# Patient Record
Sex: MALE | Race: WHITE | NOT HISPANIC OR LATINO | Employment: OTHER | URBAN - METROPOLITAN AREA
[De-identification: names, ages, dates, MRNs, and addresses within clinical notes are randomized per-mention and may not be internally consistent; named-entity substitution may affect disease eponyms.]

---

## 2017-06-28 ENCOUNTER — ANESTHESIA EVENT (OUTPATIENT)
Dept: SURGERY | Facility: HOSPITAL | Age: 82
DRG: 330 | End: 2017-06-28
Payer: MEDICARE

## 2017-06-28 ENCOUNTER — HOSPITAL ENCOUNTER (OUTPATIENT)
Dept: CARDIOLOGY | Facility: CLINIC | Age: 82
Discharge: HOME | End: 2017-06-28
Payer: MEDICARE

## 2017-06-28 ENCOUNTER — OFFICE VISIT (OUTPATIENT)
Dept: SURGERY | Facility: CLINIC | Age: 82
End: 2017-06-28
Payer: MEDICARE

## 2017-06-28 ENCOUNTER — HOSPITAL ENCOUNTER (OUTPATIENT)
Dept: PREADMISSION TESTING | Facility: HOSPITAL | Age: 82
Discharge: HOME | End: 2017-06-28
Attending: ANESTHESIOLOGY
Payer: MEDICARE

## 2017-06-28 VITALS — WEIGHT: 156.75 LBS | DIASTOLIC BLOOD PRESSURE: 64 MMHG | SYSTOLIC BLOOD PRESSURE: 143 MMHG | HEART RATE: 72 BPM

## 2017-06-28 VITALS
OXYGEN SATURATION: 98 % | TEMPERATURE: 98 F | DIASTOLIC BLOOD PRESSURE: 67 MMHG | BODY MASS INDEX: 23.11 KG/M2 | SYSTOLIC BLOOD PRESSURE: 103 MMHG | HEART RATE: 67 BPM | WEIGHT: 156 LBS | RESPIRATION RATE: 16 BRPM | HEIGHT: 69 IN

## 2017-06-28 DIAGNOSIS — I48.20 CHRONIC ATRIAL FIBRILLATION: Primary | ICD-10-CM

## 2017-06-28 DIAGNOSIS — C18.2 MALIGNANT NEOPLASM OF ASCENDING COLON: ICD-10-CM

## 2017-06-28 DIAGNOSIS — I48.20 CHRONIC ATRIAL FIBRILLATION: ICD-10-CM

## 2017-06-28 DIAGNOSIS — C18.2 CANCER OF RIGHT COLON: ICD-10-CM

## 2017-06-28 DIAGNOSIS — C18.2 MALIGNANT NEOPLASM OF ASCENDING COLON: Primary | ICD-10-CM

## 2017-06-28 PROCEDURE — 93005 ELECTROCARDIOGRAM TRACING: CPT | Mod: PBBFAC | Performed by: INTERNAL MEDICINE

## 2017-06-28 PROCEDURE — 99999 PR PBB SHADOW E&M-NEW PATIENT-LVL III: CPT | Mod: PBBFAC,,, | Performed by: COLON & RECTAL SURGERY

## 2017-06-28 PROCEDURE — 1126F AMNT PAIN NOTED NONE PRSNT: CPT | Mod: ,,, | Performed by: COLON & RECTAL SURGERY

## 2017-06-28 PROCEDURE — 93010 ELECTROCARDIOGRAM REPORT: CPT | Mod: S$PBB,,, | Performed by: INTERNAL MEDICINE

## 2017-06-28 PROCEDURE — 1159F MED LIST DOCD IN RCRD: CPT | Mod: ,,, | Performed by: COLON & RECTAL SURGERY

## 2017-06-28 PROCEDURE — 99205 OFFICE O/P NEW HI 60 MIN: CPT | Mod: S$PBB,,, | Performed by: COLON & RECTAL SURGERY

## 2017-06-28 RX ORDER — ACETAMINOPHEN 10 MG/ML
1000 INJECTION, SOLUTION INTRAVENOUS
Status: CANCELLED | OUTPATIENT
Start: 2017-06-28 | End: 2017-06-28

## 2017-06-28 RX ORDER — METRONIDAZOLE 250 MG/1
TABLET ORAL
Qty: 3 TABLET | Refills: 0 | Status: ON HOLD | OUTPATIENT
Start: 2017-06-28 | End: 2017-07-06 | Stop reason: HOSPADM

## 2017-06-28 RX ORDER — METOPROLOL SUCCINATE 25 MG/1
TABLET, EXTENDED RELEASE ORAL
Refills: 0 | COMMUNITY
Start: 2017-04-11

## 2017-06-28 RX ORDER — SODIUM CHLORIDE 9 MG/ML
INJECTION, SOLUTION INTRAVENOUS CONTINUOUS
Status: CANCELLED | OUTPATIENT
Start: 2017-06-28

## 2017-06-28 RX ORDER — NEOMYCIN SULFATE 500 MG/1
1000 TABLET ORAL ONCE
Qty: 6 TABLET | Refills: 0 | Status: SHIPPED | OUTPATIENT
Start: 2017-06-28 | End: 2017-06-29

## 2017-06-28 RX ORDER — METRONIDAZOLE 500 MG/100ML
500 INJECTION, SOLUTION INTRAVENOUS
Status: CANCELLED | OUTPATIENT
Start: 2017-06-28

## 2017-06-28 NOTE — PROGRESS NOTES
Patient ID:  Sushant Cruz is a 88 y.o. male     Chief Complaint: No chief complaint on file.       HPI: Cancer at hepatic flexure. C-scope 6/23/17 for iron def anemia. Found to have a mass ascending colon biopsy inv adenoca in villous adenoma.  2 small poiyps removed    CT scan 6/14/17 no evidence of mets. Sigmoid colon in left inguinal hernia. Pulmonary nodule recommend f/u in 3 months.    Had r inguinal hernia in 2016 with small bowel resection for incarceration.    Cardiac clearance. On Elaquis for A fib    ROS:        Constitutional: No fever, chills, activity or appetite change.      HENT: No hearing loss, facial swelling, neck pain or stiffness.       Eyes: No discharge, itching and visual disturbance.      Respiratory: No apnea, cough, choking or shortness of breath.       Cardiovascular: No leg swelling or chest pain      Gastrointestinal: No abdominal distention or change in bowel habbits     Genitourinary: No dysuria, frequency or flank pain.      Musculoskeletal: No arthralgias or gait problem.      Neurological: No dizziness, seizures or weakness.      Hematological: No adenopathy.      Psychiatric/Behavioral: No hallucinations or behavioral problems.       PE:    APPEARANCE: Well nourished, well developed, in no acute distress.   CHEST: Lungs clear. Normal respiratory effort.  CARDIOVASCULAR: Normal rhythm. No edema.  ABDOMEN: Soft. No tenderness or masses.  Rectum:  Normal skin, NST, no masses or tenderness    Musculoskeletal: Symmetric, normal range of motion and strength.   Neurological: Alert and oriented to person, place, and time. Normal reflexes.   Skin: Skin is warm and dry.   Psychiatric: Normal mood and affect. Behavior is normal and appropriate.     Impression:  Hepatic flexure adenocarcinoma     PLAN: right colectomy.  I have explained the procedure including indications, alternatives, expected outcomes and potential complications. The patient appears to understand and gives informed  consent. The patient is medically ready for surgery.

## 2017-06-28 NOTE — H&P
Sushant Cruz is a 88 y.o. male     Chief Complaint: No chief complaint on file.       HPI: Cancer at hepatic flexure. C-scope 6/23/17 for iron def anemia. Found to have a mass ascending colon biopsy inv adenoca in villous adenoma.  2 small poiyps removed    CT scan 6/14/17 no evidence of mets. Sigmoid colon in left inguinal hernia. Pulmonary nodule recommend f/u in 3 months.    Had r inguinal hernia in 2016 with small bowel resection for incarceration.    Cardiac clearance. On Elaquis for A fib    ROS:        Constitutional: No fever, chills, activity or appetite change.      HENT: No hearing loss, facial swelling, neck pain or stiffness.       Eyes: No discharge, itching and visual disturbance.      Respiratory: No apnea, cough, choking or shortness of breath.       Cardiovascular: No leg swelling or chest pain      Gastrointestinal: No abdominal distention or change in bowel habbits     Genitourinary: No dysuria, frequency or flank pain.      Musculoskeletal: No arthralgias or gait problem.      Neurological: No dizziness, seizures or weakness.      Hematological: No adenopathy.      Psychiatric/Behavioral: No hallucinations or behavioral problems.       PE:    APPEARANCE: Well nourished, well developed, in no acute distress.   CHEST: Lungs clear. Normal respiratory effort.  CARDIOVASCULAR: Normal rhythm. No edema.  ABDOMEN: Soft. No tenderness or masses.  Rectum:  Normal skin, NST, no masses or tenderness    Musculoskeletal: Symmetric, normal range of motion and strength.   Neurological: Alert and oriented to person, place, and time. Normal reflexes.   Skin: Skin is warm and dry.   Psychiatric: Normal mood and affect. Behavior is normal and appropriate.     Impression:  Hepatic flexure adenocarcinoma     PLAN: right colectomy.  I have explained the procedure including indications, alternatives, expected outcomes and potential complications. The patient appears to understand and gives informed consent. The  patient is medically ready for surgery.

## 2017-06-28 NOTE — ANESTHESIA PREPROCEDURE EVALUATION
Pre-operative evaluation for Procedure(s) (LRB):  COLECTOMY-RIGHT (N/A)    Sushant Cruz is a very healthy 88 y.o. male with pmh of A-fib, anemia and colon cancer who presents for pre-op evaluation for above procedure. Most recent anesthesia, 2016 for R inguinal hernia repair done at an OSH, performed under general anesthesia. Pt. Denies any problems with anesthesia for this procedure or any prior procedures. 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 He takes the stairs everyday, both up and down and only notes mild SOB when he reaches the 5th floor. He has only recently developed this mild SOB, ~6 months ago, and believes it is associated with the onset of his A-fib.     Concerning A-fib, he was diagnosed incidentally during pre-op evaluation for his hernia repair in 2016. Pt. is rate controlled on metoprolol and anticoagulated with pradaxa. He follows with Dr. Tao Johnson with [employer] [facility] cardiology for his A-fib, and was instructed by his cardiologist to stop the pradaxa 5 days before this surgery.     Pt. Also diagnosed with iron deficiency anemia. Bleeding believed to be concomitant of colon cancer and pradaxa use.     Prev airway:   None on file    Patient Active Problem List   Diagnosis    Malignant neoplasm of ascending colon    Chronic atrial fibrillation       Review of patient's allergies indicates:  No Known Allergies     Current Outpatient Prescriptions on File Prior to Encounter   Medication Sig Dispense Refill    metoprolol succinate (TOPROL-XL) 25 MG 24 hr tablet TK 1 T PO QD  0    metronidazole (FLAGYL) 250 MG tablet Take tablet at 1300, 1400, and 2300 day prior to surgery 3 tablet 0    neomycin (MYCIFRADIN) 500 mg Tab Take 2 tablets (1,000 mg total) by mouth once. Day before surgery take 1,000 mg (2 tablets) at 1300, 1400, and 2300 hours 6 tablet 0     No current facility-administered medications on file  prior to encounter.        No past surgical history on file.    Social History     Social History    Marital status:      Spouse name: N/A    Number of children: N/A    Years of education: N/A     Occupational History    Not on file.     Social History Main Topics    Smoking status: Not on file    Smokeless tobacco: Never Used    Alcohol use Not on file    Drug use: Unknown    Sexual activity: Not on file     Other Topics Concern    Not on file     Social History Narrative    No narrative on file         Vital Signs Range (Last 24H):  Pulse:  [72]   BP: (143)/(64)       CBC: No results for input(s): WBC, RBC, HGB, HCT, PLT, MCV, MCH, MCHC in the last 72 hours.    CMP: No results for input(s): NA, K, CL, CO2, BUN, CREATININE, GLU, MG, PHOS, CALCIUM, ALBUMIN, PROT, ALKPHOS, ALT, AST, BILITOT in the last 72 hours.    INR  No results for input(s): INR, PROTIME, APTT in the last 72 hours.    Invalid input(s): PT    EKG:  Pending       Anesthesia Evaluation    I have reviewed the Patient Summary Reports.    I have reviewed the Nursing Notes.   I have reviewed the Medications.     Review of Systems  Anesthesia Hx:  No problems with previous Anesthesia  History of prior surgery of interest to airway management or planning: (Inguinal hernia 2016) Denies Family Hx of Anesthesia complications.   Denies Personal Hx of Anesthesia complications.   Social:  Non-Smoker    Hematology/Oncology:         -- Anemia: Current/Recent Cancer.   EENT/Dental:EENT/Dental Normal   Cardiovascular:   Exercise tolerance: good Denies Hypertension.  Denies CAD.   Dysrhythmias atrial fibrillation  Denies Angina.        Pulmonary:  Pulmonary Normal    Renal/:  Renal/ Normal     Hepatic/GI:  Hepatic/GI Normal    Musculoskeletal:  Musculoskeletal Normal    Neurological:  Neurology Normal        6/30/17: Outside [facility] Records scanned 6/29/17.  Most recent Echo on pg. 91 and most recent Cardiology note on pg. 103--Moody  RN        Physical Exam  General:  Well nourished Appears younger than stated age.     Airway/Jaw/Neck:  Airway Findings: Mouth Opening: Normal Tongue: Normal  General Airway Assessment: Adult  Mallampati: I  Improves to I with phonation.  TM Distance: Normal, at least 6 cm      Dental:  Dental Findings: In tact   Chest/Lungs:  Chest/Lungs Findings: Clear to auscultation, Normal Respiratory Rate     Heart/Vascular:  Heart Findings: Rate: Normal  Rhythm: Regular Rhythm  Sounds: Normal  Heart murmur: negative    Abdomen:  Abdomen Findings: Normal      Mental Status:  Mental Status Findings:  Cooperative, Alert and Oriented         Anesthesia Plan  Type of Anesthesia, risks & benefits discussed:  Anesthesia Type:  general  Patient's Preference: General   Intra-op Monitoring Plan: standard ASA monitors  Intra-op Monitoring Plan Comments:   Post Op Pain Control Plan: multimodal analgesia and IV/PO Opioids PRN  Post Op Pain Control Plan Comments:   Induction:   IV  Beta Blocker:  Patient is on a Beta-Blocker and has received one dose within the past 24 hours (No further documentation required).       Informed Consent: Patient understands risks and agrees with Anesthesia plan.  Questions answered. Anesthesia consent signed with patient.  ASA Score: 3     Day of Surgery Review of History & Physical:    H&P update referred to the surgeon.     Anesthesia Plan Notes: NPO confirmed.   No history of anesthesia problems.   2nd PIV          Ready For Surgery From Anesthesia Perspective.

## 2017-06-29 RX ORDER — WITCH HAZEL 50 %
2000 PADS, MEDICATED (EA) TOPICAL DAILY
COMMUNITY

## 2017-06-29 RX ORDER — FERROUS SULFATE 325(65) MG
325 TABLET ORAL 3 TIMES DAILY
COMMUNITY

## 2017-07-03 ENCOUNTER — HOSPITAL ENCOUNTER (INPATIENT)
Facility: HOSPITAL | Age: 82
LOS: 3 days | Discharge: HOME | DRG: 330 | End: 2017-07-06
Attending: COLON & RECTAL SURGERY | Admitting: COLON & RECTAL SURGERY
Payer: MEDICARE

## 2017-07-03 ENCOUNTER — ANESTHESIA (OUTPATIENT)
Dept: SURGERY | Facility: HOSPITAL | Age: 82
DRG: 330 | End: 2017-07-03
Payer: MEDICARE

## 2017-07-03 ENCOUNTER — SURGERY (OUTPATIENT)
Age: 82
End: 2017-07-03

## 2017-07-03 DIAGNOSIS — C18.2 MALIGNANT NEOPLASM OF ASCENDING COLON: ICD-10-CM

## 2017-07-03 DIAGNOSIS — C18.2 CANCER OF RIGHT COLON: ICD-10-CM

## 2017-07-03 DIAGNOSIS — I48.20 CHRONIC ATRIAL FIBRILLATION: Primary | ICD-10-CM

## 2017-07-03 LAB
ABO + RH BLD: NORMAL
BLD GP AB SCN CELLS X3 SERPL QL: NORMAL

## 2017-07-03 PROCEDURE — 25000003 PHARM REV CODE 250: Performed by: NURSE ANESTHETIST, CERTIFIED REGISTERED

## 2017-07-03 PROCEDURE — 63600175 PHARM REV CODE 636 W HCPCS: Performed by: COLON & RECTAL SURGERY

## 2017-07-03 PROCEDURE — 25000003 PHARM REV CODE 250: Performed by: SURGERY

## 2017-07-03 PROCEDURE — C1769 GUIDE WIRE: HCPCS | Performed by: COLON & RECTAL SURGERY

## 2017-07-03 PROCEDURE — 71000039 HC RECOVERY, EACH ADD'L HOUR: Performed by: COLON & RECTAL SURGERY

## 2017-07-03 PROCEDURE — 88307 TISSUE EXAM BY PATHOLOGIST: CPT | Performed by: PATHOLOGY

## 2017-07-03 PROCEDURE — 36000709 HC OR TIME LEV III EA ADD 15 MIN: Performed by: COLON & RECTAL SURGERY

## 2017-07-03 PROCEDURE — 86900 BLOOD TYPING SEROLOGIC ABO: CPT

## 2017-07-03 PROCEDURE — 0T9B80Z DRAINAGE OF BLADDER WITH DRAINAGE DEVICE, VIA NATURAL OR ARTIFICIAL OPENING ENDOSCOPIC: ICD-10-PCS | Performed by: UROLOGY

## 2017-07-03 PROCEDURE — 0WQF0ZZ REPAIR ABDOMINAL WALL, OPEN APPROACH: ICD-10-PCS | Performed by: COLON & RECTAL SURGERY

## 2017-07-03 PROCEDURE — 25000003 PHARM REV CODE 250: Performed by: COLON & RECTAL SURGERY

## 2017-07-03 PROCEDURE — 63600175 PHARM REV CODE 636 W HCPCS: Performed by: SURGERY

## 2017-07-03 PROCEDURE — 37000009 HC ANESTHESIA EA ADD 15 MINS: Performed by: COLON & RECTAL SURGERY

## 2017-07-03 PROCEDURE — 86850 RBC ANTIBODY SCREEN: CPT

## 2017-07-03 PROCEDURE — 37000008 HC ANESTHESIA 1ST 15 MINUTES: Performed by: COLON & RECTAL SURGERY

## 2017-07-03 PROCEDURE — D9220A PRA ANESTHESIA: Mod: CRNA,,, | Performed by: NURSE ANESTHETIST, CERTIFIED REGISTERED

## 2017-07-03 PROCEDURE — 71000033 HC RECOVERY, INTIAL HOUR: Performed by: COLON & RECTAL SURGERY

## 2017-07-03 PROCEDURE — 27201423 OPTIME MED/SURG SUP & DEVICES STERILE SUPPLY: Performed by: COLON & RECTAL SURGERY

## 2017-07-03 PROCEDURE — 63600175 PHARM REV CODE 636 W HCPCS: Performed by: NURSE ANESTHETIST, CERTIFIED REGISTERED

## 2017-07-03 PROCEDURE — 49507 PRP I/HERN INIT BLOCK >5 YR: CPT | Mod: 51,,, | Performed by: COLON & RECTAL SURGERY

## 2017-07-03 PROCEDURE — 88307 TISSUE EXAM BY PATHOLOGIST: CPT | Mod: 26,,, | Performed by: PATHOLOGY

## 2017-07-03 PROCEDURE — 20600001 HC STEP DOWN PRIVATE ROOM

## 2017-07-03 PROCEDURE — D9220A PRA ANESTHESIA: Mod: ANES,,, | Performed by: ANESTHESIOLOGY

## 2017-07-03 PROCEDURE — 0DTF0ZZ RESECTION OF RIGHT LARGE INTESTINE, OPEN APPROACH: ICD-10-PCS | Performed by: COLON & RECTAL SURGERY

## 2017-07-03 PROCEDURE — 36000708 HC OR TIME LEV III 1ST 15 MIN: Performed by: COLON & RECTAL SURGERY

## 2017-07-03 PROCEDURE — 44140 PARTIAL REMOVAL OF COLON: CPT | Mod: ,,, | Performed by: COLON & RECTAL SURGERY

## 2017-07-03 RX ORDER — KETAMINE HCL IN 0.9 % NACL 50 MG/5 ML
SYRINGE (ML) INTRAVENOUS
Status: DISCONTINUED | OUTPATIENT
Start: 2017-07-03 | End: 2017-07-03

## 2017-07-03 RX ORDER — ROCURONIUM BROMIDE 10 MG/ML
INJECTION, SOLUTION INTRAVENOUS
Status: DISCONTINUED | OUTPATIENT
Start: 2017-07-03 | End: 2017-07-03

## 2017-07-03 RX ORDER — FENTANYL CITRATE 50 UG/ML
INJECTION, SOLUTION INTRAMUSCULAR; INTRAVENOUS
Status: DISCONTINUED | OUTPATIENT
Start: 2017-07-03 | End: 2017-07-03

## 2017-07-03 RX ORDER — HYDROMORPHONE HYDROCHLORIDE 1 MG/ML
1 INJECTION, SOLUTION INTRAMUSCULAR; INTRAVENOUS; SUBCUTANEOUS EVERY 4 HOURS PRN
Status: DISCONTINUED | OUTPATIENT
Start: 2017-07-03 | End: 2017-07-06 | Stop reason: HOSPADM

## 2017-07-03 RX ORDER — NEOSTIGMINE METHYLSULFATE 1 MG/ML
INJECTION, SOLUTION INTRAVENOUS
Status: DISCONTINUED | OUTPATIENT
Start: 2017-07-03 | End: 2017-07-03

## 2017-07-03 RX ORDER — SODIUM CHLORIDE 0.9 % (FLUSH) 0.9 %
3 SYRINGE (ML) INJECTION EVERY 8 HOURS
Status: DISCONTINUED | OUTPATIENT
Start: 2017-07-03 | End: 2017-07-03 | Stop reason: HOSPADM

## 2017-07-03 RX ORDER — BUPIVACAINE HYDROCHLORIDE 2.5 MG/ML
INJECTION, SOLUTION EPIDURAL; INFILTRATION; INTRACAUDAL
Status: DISCONTINUED | OUTPATIENT
Start: 2017-07-03 | End: 2017-07-03 | Stop reason: HOSPADM

## 2017-07-03 RX ORDER — ONDANSETRON 2 MG/ML
4 INJECTION INTRAMUSCULAR; INTRAVENOUS DAILY PRN
Status: DISCONTINUED | OUTPATIENT
Start: 2017-07-03 | End: 2017-07-03 | Stop reason: HOSPADM

## 2017-07-03 RX ORDER — NALOXONE HCL 0.4 MG/ML
VIAL (ML) INJECTION
Status: DISCONTINUED | OUTPATIENT
Start: 2017-07-03 | End: 2017-07-03

## 2017-07-03 RX ORDER — ONDANSETRON 2 MG/ML
4 INJECTION INTRAMUSCULAR; INTRAVENOUS EVERY 12 HOURS PRN
Status: DISCONTINUED | OUTPATIENT
Start: 2017-07-03 | End: 2017-07-06 | Stop reason: HOSPADM

## 2017-07-03 RX ORDER — ACETAMINOPHEN 10 MG/ML
1000 INJECTION, SOLUTION INTRAVENOUS EVERY 8 HOURS
Status: DISPENSED | OUTPATIENT
Start: 2017-07-03 | End: 2017-07-04

## 2017-07-03 RX ORDER — DEXAMETHASONE SODIUM PHOSPHATE 4 MG/ML
INJECTION, SOLUTION INTRA-ARTICULAR; INTRALESIONAL; INTRAMUSCULAR; INTRAVENOUS; SOFT TISSUE
Status: DISCONTINUED | OUTPATIENT
Start: 2017-07-03 | End: 2017-07-03

## 2017-07-03 RX ORDER — METRONIDAZOLE 500 MG/100ML
500 INJECTION, SOLUTION INTRAVENOUS
Status: COMPLETED | OUTPATIENT
Start: 2017-07-03 | End: 2017-07-03

## 2017-07-03 RX ORDER — ACETAMINOPHEN 10 MG/ML
1000 INJECTION, SOLUTION INTRAVENOUS
Status: COMPLETED | OUTPATIENT
Start: 2017-07-03 | End: 2017-07-03

## 2017-07-03 RX ORDER — SODIUM CHLORIDE 9 MG/ML
INJECTION, SOLUTION INTRAVENOUS CONTINUOUS
Status: DISCONTINUED | OUTPATIENT
Start: 2017-07-03 | End: 2017-07-06 | Stop reason: HOSPADM

## 2017-07-03 RX ORDER — SODIUM CHLORIDE 0.9 % (FLUSH) 0.9 %
3 SYRINGE (ML) INJECTION
Status: DISCONTINUED | OUTPATIENT
Start: 2017-07-03 | End: 2017-07-03 | Stop reason: HOSPADM

## 2017-07-03 RX ORDER — METOPROLOL SUCCINATE 25 MG/1
25 TABLET, EXTENDED RELEASE ORAL DAILY
Status: DISCONTINUED | OUTPATIENT
Start: 2017-07-03 | End: 2017-07-06 | Stop reason: HOSPADM

## 2017-07-03 RX ORDER — NALOXONE HCL 0.4 MG/ML
0.02 VIAL (ML) INJECTION
Status: DISCONTINUED | OUTPATIENT
Start: 2017-07-03 | End: 2017-07-06 | Stop reason: HOSPADM

## 2017-07-03 RX ORDER — ALVIMOPAN 12 MG/1
12 CAPSULE ORAL 2 TIMES DAILY
Status: DISCONTINUED | OUTPATIENT
Start: 2017-07-03 | End: 2017-07-06 | Stop reason: HOSPADM

## 2017-07-03 RX ORDER — MIDAZOLAM HYDROCHLORIDE 1 MG/ML
INJECTION, SOLUTION INTRAMUSCULAR; INTRAVENOUS
Status: DISCONTINUED | OUTPATIENT
Start: 2017-07-03 | End: 2017-07-03

## 2017-07-03 RX ORDER — FENTANYL CITRATE 50 UG/ML
25 INJECTION, SOLUTION INTRAMUSCULAR; INTRAVENOUS EVERY 5 MIN PRN
Status: DISCONTINUED | OUTPATIENT
Start: 2017-07-03 | End: 2017-07-03 | Stop reason: HOSPADM

## 2017-07-03 RX ORDER — HYDROCODONE BITARTRATE AND ACETAMINOPHEN 5; 325 MG/1; MG/1
1 TABLET ORAL EVERY 4 HOURS PRN
Status: DISCONTINUED | OUTPATIENT
Start: 2017-07-03 | End: 2017-07-06 | Stop reason: HOSPADM

## 2017-07-03 RX ORDER — PHENYLEPHRINE HYDROCHLORIDE 10 MG/ML
INJECTION INTRAVENOUS
Status: DISCONTINUED | OUTPATIENT
Start: 2017-07-03 | End: 2017-07-03

## 2017-07-03 RX ORDER — GLYCOPYRROLATE 0.2 MG/ML
INJECTION INTRAMUSCULAR; INTRAVENOUS
Status: DISCONTINUED | OUTPATIENT
Start: 2017-07-03 | End: 2017-07-03

## 2017-07-03 RX ORDER — SODIUM CHLORIDE, SODIUM LACTATE, POTASSIUM CHLORIDE, CALCIUM CHLORIDE 600; 310; 30; 20 MG/100ML; MG/100ML; MG/100ML; MG/100ML
INJECTION, SOLUTION INTRAVENOUS CONTINUOUS
Status: DISCONTINUED | OUTPATIENT
Start: 2017-07-03 | End: 2017-07-04

## 2017-07-03 RX ORDER — HYDROMORPHONE HYDROCHLORIDE 2 MG/ML
INJECTION, SOLUTION INTRAMUSCULAR; INTRAVENOUS; SUBCUTANEOUS
Status: DISCONTINUED | OUTPATIENT
Start: 2017-07-03 | End: 2017-07-03

## 2017-07-03 RX ORDER — LIDOCAINE HCL/PF 100 MG/5ML
SYRINGE (ML) INTRAVENOUS
Status: DISCONTINUED | OUTPATIENT
Start: 2017-07-03 | End: 2017-07-03

## 2017-07-03 RX ORDER — PROPOFOL 10 MG/ML
VIAL (ML) INTRAVENOUS
Status: DISCONTINUED | OUTPATIENT
Start: 2017-07-03 | End: 2017-07-03

## 2017-07-03 RX ADMIN — NALOXONE HYDROCHLORIDE 40 MCG: 0.4 INJECTION, SOLUTION INTRAMUSCULAR; INTRAVENOUS; SUBCUTANEOUS at 11:07

## 2017-07-03 RX ADMIN — NEOSTIGMINE METHYLSULFATE 4 MG: 1 INJECTION INTRAVENOUS at 10:07

## 2017-07-03 RX ADMIN — PROPOFOL 150 MG: 10 INJECTION, EMULSION INTRAVENOUS at 08:07

## 2017-07-03 RX ADMIN — LIDOCAINE HYDROCHLORIDE 80 MG: 20 INJECTION, SOLUTION INTRAVENOUS at 08:07

## 2017-07-03 RX ADMIN — ROCURONIUM BROMIDE 50 MG: 10 INJECTION, SOLUTION INTRAVENOUS at 08:07

## 2017-07-03 RX ADMIN — METOPROLOL SUCCINATE 25 MG: 25 TABLET, EXTENDED RELEASE ORAL at 11:07

## 2017-07-03 RX ADMIN — BUPIVACAINE 20 ML: 13.3 INJECTION, SUSPENSION, LIPOSOMAL INFILTRATION at 09:07

## 2017-07-03 RX ADMIN — ALVIMOPAN 12 MG: 12 CAPSULE ORAL at 11:07

## 2017-07-03 RX ADMIN — FENTANYL CITRATE 100 MCG: 50 INJECTION, SOLUTION INTRAMUSCULAR; INTRAVENOUS at 08:07

## 2017-07-03 RX ADMIN — ROCURONIUM BROMIDE 10 MG: 10 INJECTION, SOLUTION INTRAVENOUS at 09:07

## 2017-07-03 RX ADMIN — IBUPROFEN 800 MG: 800 INJECTION INTRAVENOUS at 03:07

## 2017-07-03 RX ADMIN — Medication 25 MG: at 09:07

## 2017-07-03 RX ADMIN — DEXAMETHASONE SODIUM PHOSPHATE 4 MG: 4 INJECTION, SOLUTION INTRAMUSCULAR; INTRAVENOUS at 08:07

## 2017-07-03 RX ADMIN — PHENYLEPHRINE HYDROCHLORIDE 100 MCG: 10 INJECTION INTRAVENOUS at 09:07

## 2017-07-03 RX ADMIN — HYDROMORPHONE HYDROCHLORIDE 0.5 MG: 2 INJECTION, SOLUTION INTRAMUSCULAR; INTRAVENOUS; SUBCUTANEOUS at 09:07

## 2017-07-03 RX ADMIN — PROPOFOL 40 MG: 10 INJECTION, EMULSION INTRAVENOUS at 10:07

## 2017-07-03 RX ADMIN — SODIUM CHLORIDE, SODIUM GLUCONATE, SODIUM ACETATE, POTASSIUM CHLORIDE, MAGNESIUM CHLORIDE, SODIUM PHOSPHATE, DIBASIC, AND POTASSIUM PHOSPHATE: .53; .5; .37; .037; .03; .012; .00082 INJECTION, SOLUTION INTRAVENOUS at 09:07

## 2017-07-03 RX ADMIN — FENTANYL CITRATE 50 MCG: 50 INJECTION, SOLUTION INTRAMUSCULAR; INTRAVENOUS at 09:07

## 2017-07-03 RX ADMIN — SODIUM CHLORIDE, SODIUM GLUCONATE, SODIUM ACETATE, POTASSIUM CHLORIDE, MAGNESIUM CHLORIDE, SODIUM PHOSPHATE, DIBASIC, AND POTASSIUM PHOSPHATE: .53; .5; .37; .037; .03; .012; .00082 INJECTION, SOLUTION INTRAVENOUS at 08:07

## 2017-07-03 RX ADMIN — SODIUM CHLORIDE: 0.9 INJECTION, SOLUTION INTRAVENOUS at 07:07

## 2017-07-03 RX ADMIN — MIDAZOLAM HYDROCHLORIDE 0.5 MG: 1 INJECTION, SOLUTION INTRAMUSCULAR; INTRAVENOUS at 08:07

## 2017-07-03 RX ADMIN — ACETAMINOPHEN 1000 MG: 10 INJECTION, SOLUTION INTRAVENOUS at 10:07

## 2017-07-03 RX ADMIN — IBUPROFEN 800 MG: 800 INJECTION INTRAVENOUS at 08:07

## 2017-07-03 RX ADMIN — BUPIVACAINE HYDROCHLORIDE 30 ML: 2.5 INJECTION, SOLUTION EPIDURAL; INFILTRATION; INTRACAUDAL; PERINEURAL at 09:07

## 2017-07-03 RX ADMIN — ALVIMOPAN 12 MG: 12 CAPSULE ORAL at 09:07

## 2017-07-03 RX ADMIN — SODIUM CHLORIDE, SODIUM LACTATE, POTASSIUM CHLORIDE, AND CALCIUM CHLORIDE: .6; .31; .03; .02 INJECTION, SOLUTION INTRAVENOUS at 11:07

## 2017-07-03 RX ADMIN — ACETAMINOPHEN 1000 MG: 10 INJECTION, SOLUTION INTRAVENOUS at 07:07

## 2017-07-03 RX ADMIN — HYDROCODONE BITARTRATE AND ACETAMINOPHEN 1 TABLET: 5; 325 TABLET ORAL at 11:07

## 2017-07-03 RX ADMIN — GLYCOPYRROLATE 0.6 MG: 0.2 INJECTION, SOLUTION INTRAMUSCULAR; INTRAVENOUS at 10:07

## 2017-07-03 RX ADMIN — ACETAMINOPHEN 1000 MG: 10 INJECTION, SOLUTION INTRAVENOUS at 02:07

## 2017-07-03 RX ADMIN — IBUPROFEN 800 MG: 800 INJECTION INTRAVENOUS at 09:07

## 2017-07-03 RX ADMIN — CEFTRIAXONE 2 G: 2 INJECTION, SOLUTION INTRAVENOUS at 08:07

## 2017-07-03 RX ADMIN — METRONIDAZOLE 500 MG: 500 SOLUTION INTRAVENOUS at 08:07

## 2017-07-03 NOTE — PROGRESS NOTES
Report called to meaghan Arana made ready fro transport to [facility] via stretcher per PCT, daughter informed of room number, pt remains stable.

## 2017-07-03 NOTE — ANESTHESIA POSTPROCEDURE EVALUATION
"Anesthesia Post Evaluation    Patient: Sushant Cruz    Procedure(s) Performed: Procedure(s) (LRB):  COLECTOMY-RIGHT (N/A)  CYSTOSCOPY  REPAIR-HERNIA-INGUINAL  (Left)    Final Anesthesia Type: general  Patient location during evaluation: PACU  Patient participation: Yes- Able to Participate  Level of consciousness: awake and alert  Post-procedure vital signs: reviewed and stable  Pain management: adequate  Airway patency: patent  PONV status at discharge: No PONV  Anesthetic complications: yes  Perioperative Events: use of sedation/narcotic reversal agents    Cardiovascular status: blood pressure returned to baseline  Respiratory status: unassisted  Hydration status: euvolemic  Follow-up not needed.  Comments: Single dose 40mcg of naloxone intra op.  Patient "wide" awake now, smiling and conversing without any notions of sedation.        Visit Vitals  /61   Pulse (!) 58   Temp 36.5 °C (97.7 °F) (Oral)   Resp (!) 9   Ht 5' 9" (1.753 m)   Wt 70.8 kg (156 lb)   SpO2 96%   BMI 23.04 kg/m²       Pain/Enrique Score: Pain Assessment Performed: Yes (7/3/2017 11:14 AM)  Presence of Pain: denies (7/3/2017 11:14 AM)  Pain Rating Prior to Med Admin: 4 (7/3/2017 11:35 AM)      "

## 2017-07-03 NOTE — PLAN OF CARE
Problem: Patient Care Overview  Goal: Plan of Care Review  Outcome: Ongoing (interventions implemented as appropriate)  Care plan reviewed and understood by patient. RR even and unlabored. VSS. Tolerating clear liquid diet with no complaints of N/V. Pt remains free of falls. No acute pain or distress noted. Will continue to monitor.

## 2017-07-03 NOTE — INTERVAL H&P NOTE
The patient has been examined and the H&P has been reviewed:    I concur with the findings and no changes have occurred since H&P was written.    Anesthesia/Surgery risks, benefits and alternative options discussed and understood by patient/family.          Active Hospital Problems    Diagnosis  POA    Cancer of right colon [C18.2]  Yes      Resolved Hospital Problems    Diagnosis Date Resolved POA   No resolved problems to display.

## 2017-07-03 NOTE — ANESTHESIA RELEASE NOTE
"Anesthesia Release from PACU Note    Patient: Sushant Cruz    Procedure(s) Performed: Procedure(s) (LRB):  COLECTOMY-RIGHT (N/A)  CYSTOSCOPY  REPAIR-HERNIA-INGUINAL  (Left)    Anesthesia type: general    Post pain: Adequate analgesia    Post assessment: no apparent anesthetic complications    Last Vitals:   Visit Vitals  /61   Pulse (!) 58   Temp 36.5 °C (97.7 °F) (Oral)   Resp (!) 9   Ht 5' 9" (1.753 m)   Wt 70.8 kg (156 lb)   SpO2 96%   BMI 23.04 kg/m²       Post vital signs: stable    Level of consciousness: awake    Nausea/Vomiting: no nausea/no vomiting    Complications: none    Airway Patency: patent    Respiratory: unassisted    Cardiovascular: stable and blood pressure at baseline    Hydration: euvolemic  "

## 2017-07-03 NOTE — OP NOTE
[facility]  Operative Note    Date: 07/03/2017    Pre-Op Diagnosis:    1. Difficult jon placement    Patient Active Problem List   Diagnosis    Malignant neoplasm of ascending colon    Chronic atrial fibrillation    Cancer of right colon       Post-Op Diagnosis: same    Procedure(s) Performed:   1.  Cystourethroscopy   2.  Jon catheter placement    Specimen(s): none    Staff Surgeon: Griffin Malone MD    Assistant Surgeon: Kim Belle MD    Anesthesia:  General endotracheal anesthesia    Indications: Sushant Cruz is a 88 y.o. male scheduled to undergo colectomy with Dr. Schwartz for rectal cancer. Called to OR for difficult jon placement, patient has no urologic history in the chart.    Findings:    1.  Poor visualization due to bleeding  2.  No evidence of stricture  3.  Able to advance scope into bladder without issues however no definitive etiology identified    Estimated Blood Loss: min    Drains:   1.  18 Fr [place] tip jon    Procedure in detail:    Upon arrival to the room the patient was asleep and in the supine position.    The penis was prepped and draped in the usual sterile fashion.  A flexible cystoscope was introduced into the penis.  Visualization was very difficult due to bleeding from previous placement attempt. The urethra was navigated as best as possible and the scope was kept anteriorly flexed. We were able to pass this into the bladder. A 18 Fr [place] jon was inserted over the wire into the bladder.  Clear yellow urine was returned.  The jon was attached to a  bag and secured to the patient's leg.      The case was handed over to the primary surgical team    Disposition:  Remove jon per protocol.     Kim Belle MD

## 2017-07-03 NOTE — TRANSFER OF CARE
"Anesthesia Transfer of Care Note    Patient: Sushant Cruz    Procedure(s) Performed: Procedure(s) (LRB):  COLECTOMY-RIGHT (N/A)  CYSTOSCOPY  REPAIR-HERNIA-INGUINAL  (Left)    Patient location: PACU    Anesthesia Type: general    Transport from OR: Transported from OR on 6-10 L/min O2 by face mask with adequate spontaneous ventilation    Post pain: adequate analgesia    Post assessment: no apparent anesthetic complications and tolerated procedure well    Post vital signs: stable    Level of consciousness: awake and responds to stimulation    Nausea/Vomiting: no nausea/vomiting    Complications: none    Transfer of care protocol was followed      Last vitals:   Visit Vitals  /66 (BP Location: Right arm, Patient Position: Lying, BP Method: Automatic)   Pulse 63   Temp (!) 30.9 °C (87.7 °F) (Oral)   Resp 18   Ht 5' 9" (1.753 m)   Wt 70.8 kg (156 lb)   SpO2 95%   BMI 23.04 kg/m²     "

## 2017-07-03 NOTE — PROGRESS NOTES
Pt resting quietly,VSS per monitor, resp unlabored, abd incision remains dry and intact, dewey po fluids and meds, c/o abd pain 2/10, states tolerable, stable at present.

## 2017-07-03 NOTE — BRIEF OP NOTE
[facility]  Colon and Rectal Surgery  Operative Note    SUMMARY     Date of Procedure: 7/3/2017     Procedure: Procedure(s) (LRB):  COLECTOMY-RIGHT (N/A)  CYSTOSCOPY Left inguinal hernia repair    Surgeon(s) and Role:     * Ernst Mcpherson MD - Fellow     * Robin Schwartz MD - Primary     * Kim Belle MD - Resident - Assisting     * Jimmy Johnson MD - Resident - Assisting        Pre-Operative Diagnosis: Malignant neoplasm of ascending colon [C18.2]    Post-Operative Diagnosis: Post-Op Diagnosis Codes:     * Malignant neoplasm of ascending colon [C18.2]    Anesthesia: General, Exparel 266 mg, Marcaine 0.25% (75 mg) preperironeal injection      Technical Procedures Used: STSFETE LC 75    Description of the Findings of the Procedure: Monica at hepatic flexure. Left inguinal hernia with sigmoid colon in sac    Significant Surgical Tasks Conducted by the Assistant(s), if Applicable: n/a    Complications: No    Estimated Blood Loss (EBL): 100 mL           Implants: * No implants in log *    Specimens:   Specimen (12h ago through future)    None           Condition: Good    Disposition: PACU - hemodynamically stable.    Attestation: I was present and scrubbed for the entire procedure.

## 2017-07-03 NOTE — PROGRESS NOTES
Orders reviewed and consents verified. Pre-op care complete. Type and screen sent to blood bank. Family at bedside.

## 2017-07-04 LAB
ANION GAP SERPL CALC-SCNC: 6 MMOL/L
BASOPHILS # BLD AUTO: 0.06 K/UL
BASOPHILS NFR BLD: 0.8 %
BUN SERPL-MCNC: 19 MG/DL
CALCIUM SERPL-MCNC: 9.1 MG/DL
CHLORIDE SERPL-SCNC: 107 MMOL/L
CO2 SERPL-SCNC: 21 MMOL/L
CREAT SERPL-MCNC: 1.1 MG/DL
DIFFERENTIAL METHOD: ABNORMAL
EOSINOPHIL # BLD AUTO: 0 K/UL
EOSINOPHIL NFR BLD: 0.3 %
ERYTHROCYTE [DISTWIDTH] IN BLOOD BY AUTOMATED COUNT: 17.5 %
EST. GFR  (AFRICAN AMERICAN): >60 ML/MIN/1.73 M^2
EST. GFR  (NON AFRICAN AMERICAN): 59.6 ML/MIN/1.73 M^2
GLUCOSE SERPL-MCNC: 90 MG/DL
HCT VFR BLD AUTO: 35.8 %
HGB BLD-MCNC: 11.7 G/DL
LYMPHOCYTES # BLD AUTO: 1.5 K/UL
LYMPHOCYTES NFR BLD: 21.3 %
MCH RBC QN AUTO: 31.5 PG
MCHC RBC AUTO-ENTMCNC: 32.7 %
MCV RBC AUTO: 96 FL
MONOCYTES # BLD AUTO: 0.6 K/UL
MONOCYTES NFR BLD: 9 %
NEUTROPHILS # BLD AUTO: 4.9 K/UL
NEUTROPHILS NFR BLD: 68.5 %
PLATELET # BLD AUTO: 244 K/UL
PMV BLD AUTO: 10.5 FL
POTASSIUM SERPL-SCNC: 4.2 MMOL/L
RBC # BLD AUTO: 3.72 M/UL
SODIUM SERPL-SCNC: 134 MMOL/L
WBC # BLD AUTO: 7.12 K/UL

## 2017-07-04 PROCEDURE — 36415 COLL VENOUS BLD VENIPUNCTURE: CPT

## 2017-07-04 PROCEDURE — 20600001 HC STEP DOWN PRIVATE ROOM

## 2017-07-04 PROCEDURE — 85025 COMPLETE CBC W/AUTO DIFF WBC: CPT

## 2017-07-04 PROCEDURE — 25000003 PHARM REV CODE 250: Performed by: SURGERY

## 2017-07-04 PROCEDURE — 51798 US URINE CAPACITY MEASURE: CPT

## 2017-07-04 PROCEDURE — 94760 N-INVAS EAR/PLS OXIMETRY 1: CPT

## 2017-07-04 PROCEDURE — 80048 BASIC METABOLIC PNL TOTAL CA: CPT

## 2017-07-04 PROCEDURE — 63600175 PHARM REV CODE 636 W HCPCS: Performed by: SURGERY

## 2017-07-04 RX ORDER — DEXTROSE MONOHYDRATE, SODIUM CHLORIDE, AND POTASSIUM CHLORIDE 50; 1.49; 4.5 G/1000ML; G/1000ML; G/1000ML
INJECTION, SOLUTION INTRAVENOUS CONTINUOUS
Status: DISCONTINUED | OUTPATIENT
Start: 2017-07-04 | End: 2017-07-06 | Stop reason: HOSPADM

## 2017-07-04 RX ORDER — TAMSULOSIN HYDROCHLORIDE 0.4 MG/1
0.4 CAPSULE ORAL ONCE
Status: COMPLETED | OUTPATIENT
Start: 2017-07-04 | End: 2017-07-04

## 2017-07-04 RX ORDER — METOPROLOL TARTRATE 1 MG/ML
5 INJECTION, SOLUTION INTRAVENOUS EVERY 6 HOURS PRN
Status: DISCONTINUED | OUTPATIENT
Start: 2017-07-04 | End: 2017-07-06 | Stop reason: HOSPADM

## 2017-07-04 RX ADMIN — IBUPROFEN 800 MG: 800 INJECTION INTRAVENOUS at 02:07

## 2017-07-04 RX ADMIN — TAMSULOSIN HYDROCHLORIDE 0.4 MG: 0.4 CAPSULE ORAL at 09:07

## 2017-07-04 RX ADMIN — ALVIMOPAN 12 MG: 12 CAPSULE ORAL at 09:07

## 2017-07-04 RX ADMIN — DEXTROSE MONOHYDRATE, SODIUM CHLORIDE, AND POTASSIUM CHLORIDE: 50; 4.5; 1.49 INJECTION, SOLUTION INTRAVENOUS at 09:07

## 2017-07-04 RX ADMIN — IBUPROFEN 800 MG: 800 INJECTION INTRAVENOUS at 09:07

## 2017-07-04 RX ADMIN — IBUPROFEN 800 MG: 800 INJECTION INTRAVENOUS at 06:07

## 2017-07-04 RX ADMIN — SODIUM CHLORIDE 1000 ML: 0.9 INJECTION, SOLUTION INTRAVENOUS at 09:07

## 2017-07-04 NOTE — PLAN OF CARE
Problem: Patient Care Overview  Goal: Plan of Care Review  Outcome: Ongoing (interventions implemented as appropriate)  Plan of care discussed with pt and pt's daughter. Pt verbalizes understanding. Pt tolerating low fiber diet with no complaints of discomfort or nausea. Pain managed with scheduled ibuprofen. Christensen d/c'd this shift. Pt has voided since removal. Pt has had small amount of liquid output per rectum. Pt on telemetry, afib. Pt ambulated in thomas once this shift. Pt up in chair for most of shift. Pt positions independently. Vital signs stable on RA. Pt remains free of falls and injury. Will continue to monitor.

## 2017-07-04 NOTE — PROGRESS NOTES
Progress Note  CRS    SUBJECTIVE:  Pt seen and examined.  Feeling well, pain well controlled, no issues.  Tolerating clears well, no n/v, feeling hungry.  HD stable.  Low urine output overnight, 200cc.    OBJECTIVE:  Temp:  [97 °F (36.1 °C)-98.4 °F (36.9 °C)]   Pulse:  [48-76]   Resp:  [12-22]   BP: (132-164)/(61-79)   SpO2:  [91 %-97 %]     PHYSICAL EXAM:  NAD  A&O x3  Abdomen soft, non-tender, non-distended. Incision c/d/i.  Jon with clear yellow urine.    I & O (Last 24H):  Intake/Output Summary (Last 24 hours) at 07/04/17 0843  Last data filed at 07/04/17 0511   Gross per 24 hour   Intake             1500 ml   Output              730 ml   Net              770 ml       Lab Results   Component Value Date    WBC 7.12 07/04/2017    HGB 11.7 (L) 07/04/2017    HCT 35.8 (L) 07/04/2017    MCV 96 07/04/2017     07/04/2017     Lab Results   Component Value Date    CREATININE 1.1 07/04/2017    BUN 19 07/04/2017     (L) 07/04/2017    K 4.2 07/04/2017     07/04/2017    CO2 21 (L) 07/04/2017    CALCIUM 9.1 07/04/2017       ASSESSMENT/PLAN:   Sushant Cruz is a 88 y.o. male POD 1 s/p   COLECTOMY-RIGHT (N/A)  CYSTOSCOPY Left inguinal hernia repair    Doing well  CLD - advance today  PO pain control prn  Ambulate  D/c jon - void check. One dose of flomax. Monitor urine output. Cr normal.  Dispo planning.    Matti Camarena MD  Surgery Resident, PGY-V  [phone]

## 2017-07-04 NOTE — OP NOTE
DATE OF PROCEDURE:  07/3/2017     ATTENDING SURGEON ON RECORD:  Robin Schwartz M.D., who was present and scrubbed for the entirety of the case.     FELLOW ASSISTING:  Ernst Mcpherson M.D. (RES)     PREOPERATIVE DIAGNOSIS:  Right Colon adenocarcinoma     POSTOPERATIVE DIAGNOSIS:  Same      PROCEDURE PERFORMED:  Open Right hemicolectomy     ANESTHESIA:  The patient received general anesthesia.     TECHNICAL PROCEDURE PERFORMED:  Open right hemicolectomy, side to side functional end to end stapled anastomosis       OPERATIVE FINDINGS:  Hepatic flexure mass     This was a clean contaminated case.  There were no complications.  one specimen sent to the pathologist being the right colon and terminal ileum.     COMPLICATIONS:  There were no intraoperative complications.     INDICATIONS FOR THE PROCEDURE:  This is an 88-year-old gentleman with finding consistent with colon adenocarcinoma on a diagnostic colonoscopy for anemia.  Metastatic workup was negative. Given these findings he was consented for a Right hemicolectomy.       DESCRIPTION OF PROCEDURE:  The patient was met in the Preoperative Holding area where his consents and site verification forms were reviewed.  Questions were answered.  Risks and benefits of the procedure were explained to include bleeding, infection, damage to surrounding structures, need for repeat procedures and possible anastomotic leak and risks of anesthesia and he agreed to proceed.  He was taken to the Operating Room, placed on the operating room   table in the supine position where general anesthesia was induced.  A Christensen   catheter was attempted however due to a large prostate the urology was called in for assistance and performed a cystography for placement.  His abdomen was prepped and draped in the usual sterile fashion.  An operative time-out was performed and the procedure began.  We initially began by making a supra umbilical incision extending just inferiorly below the umbilicus.  We  dissected down through the subcutaneous tissue and into the abdomen.  We placed a wound protector into the abdomen and began by mobilizing the right colon by dividing the colon along the white line of [place] and mobilizing the colon medially.  We palpated the ileocolic vessels and created a window deep to the vessel and clamped the vessel between two clamps.  The vessel was divided and tied with 0 chromic suture and the mesentery as divided up to the terminal ileum.  The terminal ileum was then divided with a 75mm CHACHA.  The transverse colon was then addressed by dividing the omentum from the colon and mobilizing the hepatic flexure.  The mass was palpated at the hepatic flexure and the right branch of the middle colic vessel was clamped and tied in the standard fashion using 0 chromic suture.  The transverse colon was divded using a 75mm CHACHA stapler approximately 10cm distal to the mass and the remaining mesentery was divided using electrocautery and the clamp and tie technique.  The right colon was removed from the patient as sent to the pathologist as a specimen.  We then created a side to side functional end to end stapled anastomosis with the terminal ileum and transverse colon using a 75mm CHACHA stapler.  The common enterotomy was closed in two layers with 3-0 vicryl and 4-0 ethibond.  The mesenteric defect was closed with  4-0 Ethibond suture.  The duodenum was identified throughout the procedure and protected.  The patient was also noted to have a large left inguinal hernia with incarcerated sigmoid colon.  The sigmoid was reduced into the abdomen and the hernia sack was divided.  The defect was closed primarily using 4-0 ethibond.  The abdomen was then copiously irrigated, Exparel local anesthetic was instilled in a TAP block fashion and the fascia was closed with a running number 1 PDS suture and the skin was closed with 4-0 monocryl and dermabond.  The patient tolerated the procedure well.  He was extubated  and taken to the Postanesthesia Care Unit in stable condition.    Ernst Mcpherson MD  Colon and Rectal Surgery Fellow  [phone]

## 2017-07-04 NOTE — PLAN OF CARE
Problem: Patient Care Overview  Goal: Plan of Care Review  Outcome: Ongoing (interventions implemented as appropriate)  POC reviewed with pt, understanding verbalized. AAOx4. VSS;afebrile. Room air. Spouse at bedside. Pt tolerating clear liquid diet. No complaints of N/V.  Pt denies pain. Christensen intact, urine output > 120 ml. Placed pt on tele monitoring d/t chronic-a fibrillation. Pt resting quietly in bed over night. Bed in lowest position. Call bell within reach. WCTM.

## 2017-07-04 NOTE — PROGRESS NOTES
MD Verenice notified that pt has voided 150mL since jon removal this AM. Pt has 348mL of urine in bladder per bladder scan. Order to notify MD if pt has not voided more within 2 hours. Will continue to monitor.

## 2017-07-05 LAB
ANION GAP SERPL CALC-SCNC: 5 MMOL/L
BASOPHILS # BLD AUTO: 0.03 K/UL
BASOPHILS NFR BLD: 0.5 %
BUN SERPL-MCNC: 17 MG/DL
CALCIUM SERPL-MCNC: 9.1 MG/DL
CHLORIDE SERPL-SCNC: 110 MMOL/L
CO2 SERPL-SCNC: 20 MMOL/L
CREAT SERPL-MCNC: 0.9 MG/DL
DIFFERENTIAL METHOD: ABNORMAL
EOSINOPHIL # BLD AUTO: 0.3 K/UL
EOSINOPHIL NFR BLD: 5.7 %
ERYTHROCYTE [DISTWIDTH] IN BLOOD BY AUTOMATED COUNT: 17.8 %
EST. GFR  (AFRICAN AMERICAN): >60 ML/MIN/1.73 M^2
EST. GFR  (NON AFRICAN AMERICAN): >60 ML/MIN/1.73 M^2
GLUCOSE SERPL-MCNC: 94 MG/DL
HCT VFR BLD AUTO: 32.6 %
HGB BLD-MCNC: 10.6 G/DL
LYMPHOCYTES # BLD AUTO: 1.3 K/UL
LYMPHOCYTES NFR BLD: 22.3 %
MCH RBC QN AUTO: 31.5 PG
MCHC RBC AUTO-ENTMCNC: 32.5 %
MCV RBC AUTO: 97 FL
MONOCYTES # BLD AUTO: 0.6 K/UL
MONOCYTES NFR BLD: 10.4 %
NEUTROPHILS # BLD AUTO: 3.6 K/UL
NEUTROPHILS NFR BLD: 60.8 %
PLATELET # BLD AUTO: 216 K/UL
PMV BLD AUTO: 11 FL
POTASSIUM SERPL-SCNC: 4.1 MMOL/L
RBC # BLD AUTO: 3.36 M/UL
SODIUM SERPL-SCNC: 135 MMOL/L
WBC # BLD AUTO: 5.97 K/UL

## 2017-07-05 PROCEDURE — 25000003 PHARM REV CODE 250: Performed by: SURGERY

## 2017-07-05 PROCEDURE — 80048 BASIC METABOLIC PNL TOTAL CA: CPT

## 2017-07-05 PROCEDURE — 85025 COMPLETE CBC W/AUTO DIFF WBC: CPT

## 2017-07-05 PROCEDURE — 36415 COLL VENOUS BLD VENIPUNCTURE: CPT

## 2017-07-05 PROCEDURE — 20600001 HC STEP DOWN PRIVATE ROOM

## 2017-07-05 PROCEDURE — 63600175 PHARM REV CODE 636 W HCPCS: Performed by: SURGERY

## 2017-07-05 RX ADMIN — DEXTROSE MONOHYDRATE, SODIUM CHLORIDE, AND POTASSIUM CHLORIDE: 50; 4.5; 1.49 INJECTION, SOLUTION INTRAVENOUS at 06:07

## 2017-07-05 RX ADMIN — METOPROLOL SUCCINATE 25 MG: 25 TABLET, EXTENDED RELEASE ORAL at 08:07

## 2017-07-05 RX ADMIN — ALVIMOPAN 12 MG: 12 CAPSULE ORAL at 08:07

## 2017-07-05 RX ADMIN — IBUPROFEN 800 MG: 800 INJECTION INTRAVENOUS at 01:07

## 2017-07-05 RX ADMIN — IBUPROFEN 800 MG: 800 INJECTION INTRAVENOUS at 06:07

## 2017-07-05 NOTE — PROGRESS NOTES
[facility]  Colorectal Surgery  Progress Note    Patient Name: Sushant Cruz  MRN: [id number]  Admission Date: 7/3/2017  Hospital Length of Stay: 2 days  Attending Physician: Robin Schwartz MD    Subjective:     Interval History: No acute events overnight, tolerating low residue diet without nausea or vomiting, liquid BMs but no flatus, pain minimal    Post-Op Info:  Procedure(s) (LRB):  COLECTOMY-RIGHT (N/A)  CYSTOSCOPY  REPAIR-HERNIA-INGUINAL  (Left)   2 Days Post-Op      Medications:  Continuous Infusions:   sodium chloride 0.9% Stopped (07/03/17 0931)    dextrose 5 % and 0.45 % NaCl with KCl 20 mEq 50 mL/hr at 07/04/17 0906     Scheduled Meds:   alvimopan  12 mg Oral BID    ibuprofen  800 mg Intravenous Q8H    metoprolol succinate  25 mg Oral Daily     PRN Meds:   hydrocodone-acetaminophen 5-325mg    HYDROmorphone    metoprolol    naloxone    ondansetron        Objective:     Vital Signs (Most Recent):  Temp: 98.2 °F (36.8 °C) (07/05/17 0745)  Pulse: 67 (07/05/17 0745)  Resp: 20 (07/05/17 0745)  BP: (!) 148/75 (07/05/17 0745)  SpO2: 96 % (07/05/17 0745) Vital Signs (24h Range):  Temp:  [97.6 °F (36.4 °C)-99 °F (37.2 °C)] 98.2 °F (36.8 °C)  Pulse:  [52-76] 67  Resp:  [18-20] 20  SpO2:  [93 %-97 %] 96 %  BP: (134-148)/(62-75) 148/75     Intake/Output - Last 3 Shifts       07/03 0700 - 07/04 0659 07/04 0700 - 07/05 0659 07/05 0700 - 07/06 0659    P.O.  960 360    I.V. (mL/kg) 1500 (21.2) 630 (8.9) 615 (8.7)    IV Piggyback  1000     Total Intake(mL/kg) 1500 (21.2) 2590 (36.6) 975 (13.8)    Urine (mL/kg/hr) 630 (0.4) 2025 (1.2) 575 (2)    Blood 100 (0.1)      Total Output 730 2025 575    Net +770 +565 +400                 Physical Exam    PE:   General:  male in nad  Neuro: AAO x4 MAEx4 PERRL  Chest: resps even unlabored, cap refill <2 sec  Abd: soft, nondistended, tenderness mild in the periumbilical area, without guarding and without rebound  Wound (if present): wound margins  intact and healing well.  No signs of infection.  Stoma (if present): n/a  Musculoskeletal: Symmetric, normal range of motion and strength.   Neurological: Alert and oriented to person, place, and time. Reflexes are normal.   Skin: Skin is warm and dry.   Psychiatric: Normal mood and affect. Behavior is normal and appropriate.         Significant Labs:  BMP (Last 3 Results):   Recent Labs  Lab 06/28/17  1154 07/04/17  0429 07/05/17  0447   GLU 88 90 94    134* 135*   K 4.7 4.2 4.1    107 110   CO2 24 21* 20*   BUN 21 19 17   CREATININE 1.0 1.1 0.9   CALCIUM 10.4 9.1 9.1     CBC (Last 3 Results):   Recent Labs  Lab 06/28/17  1154 07/04/17  0429 07/05/17  0447   WBC 4.72 7.12 5.97   RBC 3.74* 3.72* 3.36*   HGB 11.8* 11.7* 10.6*   HCT 36.7* 35.8* 32.6*    244 216   MCV 98 96 97   MCH 31.6* 31.5* 31.5*   MCHC 32.2 32.7 32.5       Significant Diagnostics:  None    Assessment/Plan:     Cancer of right colon    Sushant Cruz is a 88 y.o. male POD  3 s/p   COLECTOMY-RIGHT (N/A)  CYSTOSCOPY Left inguinal hernia repair     Doing well  Continue LRD today  PO pain control prn  Ambulate  Home later today or tomorrow                  Ernst Mcpherson MD  Colorectal Surgery  [facility]

## 2017-07-05 NOTE — PLAN OF CARE
Problem: Patient Care Overview  Goal: Plan of Care Review  Outcome: Ongoing (interventions implemented as appropriate)  Pt AAOx4. Pt tolerating low fiber/residue diet with no complaints of discomfort or nausea. Pt denied pain throughout night. Pt on tele, A Fib, all other VSS on RA. Pt ambulates to bathroom with standby assist. Urine output adequate. Daughter at bedside. Call light in reach and bed in lowest position. Pt remains free of falls and injury. No acute events this shift. Will continue to monitor.

## 2017-07-05 NOTE — PLAN OF CARE
Problem: Patient Care Overview  Goal: Plan of Care Review  Outcome: Ongoing (interventions implemented as appropriate)  Plan of care discussed with pt and pt's daughter. Pt verbalizes understanding. Pt tolerating low fiber diet with no complaints of discomfort or nausea. Pain managed with scheduled ibuprofen. Pt voiding an adequate amount of clear yellow urine. Pt passed gas and had one small BM this shift. Pt on telemetry, afib. Pt ambulated in thomas twice this shift. Pt up in chair for most of shift. Pt positions independently. Vital signs stable on RA. Pt remains free of falls and injury. Will continue to monitor.

## 2017-07-05 NOTE — PLAN OF CARE
No d/c needs identified at this time. Sw will continue to follow.      Missy Mares, MARYLU [id number]

## 2017-07-05 NOTE — SUBJECTIVE & OBJECTIVE
Subjective:     Interval History: No acute events overnight, tolerating low residue diet without nausea or vomiting, liquid BMs but no flatus, pain minimal    Post-Op Info:  Procedure(s) (LRB):  COLECTOMY-RIGHT (N/A)  CYSTOSCOPY  REPAIR-HERNIA-INGUINAL  (Left)   2 Days Post-Op      Medications:  Continuous Infusions:   sodium chloride 0.9% Stopped (07/03/17 0931)    dextrose 5 % and 0.45 % NaCl with KCl 20 mEq 50 mL/hr at 07/04/17 0906     Scheduled Meds:   alvimopan  12 mg Oral BID    ibuprofen  800 mg Intravenous Q8H    metoprolol succinate  25 mg Oral Daily     PRN Meds:   hydrocodone-acetaminophen 5-325mg    HYDROmorphone    metoprolol    naloxone    ondansetron        Objective:     Vital Signs (Most Recent):  Temp: 98.2 °F (36.8 °C) (07/05/17 0745)  Pulse: 67 (07/05/17 0745)  Resp: 20 (07/05/17 0745)  BP: (!) 148/75 (07/05/17 0745)  SpO2: 96 % (07/05/17 0745) Vital Signs (24h Range):  Temp:  [97.6 °F (36.4 °C)-99 °F (37.2 °C)] 98.2 °F (36.8 °C)  Pulse:  [52-76] 67  Resp:  [18-20] 20  SpO2:  [93 %-97 %] 96 %  BP: (134-148)/(62-75) 148/75     Intake/Output - Last 3 Shifts       07/03 0700 - 07/04 0659 07/04 0700 - 07/05 0659 07/05 0700 - 07/06 0659    P.O.  960 360    I.V. (mL/kg) 1500 (21.2) 630 (8.9) 615 (8.7)    IV Piggyback  1000     Total Intake(mL/kg) 1500 (21.2) 2590 (36.6) 975 (13.8)    Urine (mL/kg/hr) 630 (0.4) 2025 (1.2) 575 (2)    Blood 100 (0.1)      Total Output 730 2025 575    Net +770 +565 +400                 Physical Exam    PE:   General:  male in nad  Neuro: AAO x4 MAEx4 PERRL  Chest: resps even unlabored, cap refill <2 sec  Abd: soft, nondistended, tenderness mild in the periumbilical area, without guarding and without rebound  Wound (if present): wound margins intact and healing well.  No signs of infection.  Stoma (if present): n/a  Musculoskeletal: Symmetric, normal range of motion and strength.   Neurological: Alert and oriented to person, place, and time. Reflexes  are normal.   Skin: Skin is warm and dry.   Psychiatric: Normal mood and affect. Behavior is normal and appropriate.         Significant Labs:  BMP (Last 3 Results):   Recent Labs  Lab 06/28/17  1154 07/04/17  0429 07/05/17  0447   GLU 88 90 94    134* 135*   K 4.7 4.2 4.1    107 110   CO2 24 21* 20*   BUN 21 19 17   CREATININE 1.0 1.1 0.9   CALCIUM 10.4 9.1 9.1     CBC (Last 3 Results):   Recent Labs  Lab 06/28/17  1154 07/04/17  0429 07/05/17  0447   WBC 4.72 7.12 5.97   RBC 3.74* 3.72* 3.36*   HGB 11.8* 11.7* 10.6*   HCT 36.7* 35.8* 32.6*    244 216   MCV 98 96 97   MCH 31.6* 31.5* 31.5*   MCHC 32.2 32.7 32.5       Significant Diagnostics:  None

## 2017-07-05 NOTE — PLAN OF CARE
CM met with patient and daughter this am. Patient awake, alert and oriented. Patient states he has been getting up and ambulating to bathroom with assistance of daughter to roll IV pump, otherwise, patient is independent. Planned discharge is home with daughter - Plan (A) or discharged home with self care - Plan (B).    PCP:  Bentley Lyon MD     Payor: MEDICARE / Plan: MEDICARE PART A & B / Product Type: [employer] [id number] [place] [address] [address] [facility] [address]  [facility] [address]  [facility] [address]  Phone: [phone] Fax: [phone]       07/05/17 0906   Discharge Assessment   Assessment Type Discharge Planning Assessment   Confirmed/corrected address and phone number on facesheet? Yes   Assessment information obtained from? Patient   Prior to hospitilization cognitive status: Alert/Oriented   Prior to hospitalization functional status: Independent   Current cognitive status: Alert/Oriented   Current Functional Status: Independent   Arrived From home or self-care   Lives With alone   Able to Return to Prior Arrangements yes   Is patient able to care for self after discharge? Yes   How many people do you have in your home that can help with your care after discharge? 1   Who are your caregiver(s) and their phone number(s)? Nusrat Estevez (daughter) [phone]   Patient's perception of discharge disposition home or selfcare   Readmission Within The Last 30 Days no previous admission in last 30 days   Patient currently receives home health services? No   Does the patient currently use HME? No   Patient currently receives private duty nursing? N/A   Patient currently receives any other outside agency services? No   Equipment Currently Used at Home none   Do you have any problems affording any of your prescribed medications? No   Is the patient taking medications as prescribed? yes   Do you have any financial concerns preventing you from  receiving the healthcare you need? No   Does the patient have transportation to healthcare appointments? Yes   Transportation Available family or friend will provide   On Dialysis? No   Does the patient receive services at the Coumadin Clinic? No   Discharge Plan A Home with family   Discharge Plan B Home   Patient/Family In Agreement With Plan yes

## 2017-07-05 NOTE — ASSESSMENT & PLAN NOTE
Sushant Cruz is a 88 y.o. male POD 3 s/p   COLECTOMY-RIGHT (N/A)  CYSTOSCOPY Left inguinal hernia repair     Doing well  Continue LRD today  PO pain control prn  Ambulate  Home later today or tomorrow

## 2017-07-06 ENCOUNTER — HOSPITAL ENCOUNTER (EMERGENCY)
Facility: HOSPITAL | Age: 82
Discharge: HOME | End: 2017-07-06
Attending: EMERGENCY MEDICINE
Payer: MEDICARE

## 2017-07-06 ENCOUNTER — NURSE TRIAGE (OUTPATIENT)
Dept: ADMINISTRATIVE | Facility: CLINIC | Age: 82
End: 2017-07-06

## 2017-07-06 VITALS
TEMPERATURE: 96 F | RESPIRATION RATE: 18 BRPM | HEART RATE: 62 BPM | HEIGHT: 69 IN | DIASTOLIC BLOOD PRESSURE: 71 MMHG | WEIGHT: 156 LBS | OXYGEN SATURATION: 93 % | BODY MASS INDEX: 23.11 KG/M2 | SYSTOLIC BLOOD PRESSURE: 149 MMHG

## 2017-07-06 VITALS
BODY MASS INDEX: 23.11 KG/M2 | OXYGEN SATURATION: 96 % | RESPIRATION RATE: 16 BRPM | WEIGHT: 156 LBS | DIASTOLIC BLOOD PRESSURE: 76 MMHG | TEMPERATURE: 99 F | SYSTOLIC BLOOD PRESSURE: 159 MMHG | HEIGHT: 69 IN | HEART RATE: 84 BPM

## 2017-07-06 DIAGNOSIS — R31.9 HEMATURIA: Primary | ICD-10-CM

## 2017-07-06 LAB
ANION GAP SERPL CALC-SCNC: 3 MMOL/L
BASOPHILS # BLD AUTO: 0.03 K/UL
BASOPHILS NFR BLD: 0.5 %
BUN SERPL-MCNC: 15 MG/DL
CALCIUM SERPL-MCNC: 9.1 MG/DL
CHLORIDE SERPL-SCNC: 111 MMOL/L
CO2 SERPL-SCNC: 22 MMOL/L
CREAT SERPL-MCNC: 0.8 MG/DL
DIFFERENTIAL METHOD: ABNORMAL
EOSINOPHIL # BLD AUTO: 0.5 K/UL
EOSINOPHIL NFR BLD: 7.5 %
ERYTHROCYTE [DISTWIDTH] IN BLOOD BY AUTOMATED COUNT: 17.9 %
EST. GFR  (AFRICAN AMERICAN): >60 ML/MIN/1.73 M^2
EST. GFR  (NON AFRICAN AMERICAN): >60 ML/MIN/1.73 M^2
GLUCOSE SERPL-MCNC: 109 MG/DL
HCT VFR BLD AUTO: 31.4 %
HGB BLD-MCNC: 10.2 G/DL
LYMPHOCYTES # BLD AUTO: 1.2 K/UL
LYMPHOCYTES NFR BLD: 19.2 %
MCH RBC QN AUTO: 31.8 PG
MCHC RBC AUTO-ENTMCNC: 32.5 %
MCV RBC AUTO: 98 FL
MONOCYTES # BLD AUTO: 0.6 K/UL
MONOCYTES NFR BLD: 10.6 %
NEUTROPHILS # BLD AUTO: 3.7 K/UL
NEUTROPHILS NFR BLD: 61.7 %
PLATELET # BLD AUTO: 206 K/UL
PMV BLD AUTO: 11 FL
POTASSIUM SERPL-SCNC: 4.1 MMOL/L
RBC # BLD AUTO: 3.21 M/UL
SODIUM SERPL-SCNC: 136 MMOL/L
WBC # BLD AUTO: 6.03 K/UL

## 2017-07-06 PROCEDURE — 25000003 PHARM REV CODE 250: Performed by: SURGERY

## 2017-07-06 PROCEDURE — 80048 BASIC METABOLIC PNL TOTAL CA: CPT

## 2017-07-06 PROCEDURE — 99284 EMERGENCY DEPT VISIT MOD MDM: CPT

## 2017-07-06 PROCEDURE — 99282 EMERGENCY DEPT VISIT SF MDM: CPT | Mod: ,,, | Performed by: EMERGENCY MEDICINE

## 2017-07-06 PROCEDURE — 36415 COLL VENOUS BLD VENIPUNCTURE: CPT

## 2017-07-06 PROCEDURE — 85025 COMPLETE CBC W/AUTO DIFF WBC: CPT

## 2017-07-06 RX ORDER — HYDROCODONE BITARTRATE AND ACETAMINOPHEN 5; 325 MG/1; MG/1
1 TABLET ORAL EVERY 4 HOURS PRN
Qty: 61 TABLET | Refills: 0 | Status: SHIPPED | OUTPATIENT
Start: 2017-07-06 | End: 2017-10-18

## 2017-07-06 RX ADMIN — METOPROLOL SUCCINATE 25 MG: 25 TABLET, EXTENDED RELEASE ORAL at 08:07

## 2017-07-06 RX ADMIN — ALVIMOPAN 12 MG: 12 CAPSULE ORAL at 08:07

## 2017-07-06 NOTE — PLAN OF CARE
CM met with patient and daughter this am. Patient to be discharged home with assistance of daughter. Patient does not have any discharge needs at this time.    Future Appointments  Date Time Provider Department Center   7/19/2017 10:00 AM Robin Schwartz MD [facility]        07/06/17 0910   Final Note   Assessment Type Final Discharge Note   Discharge Disposition Home   Discharge planning education complete? Yes   Hospital Follow Up  Appt(s) scheduled? Yes   Discharge plans and expectations educations in teach back method with documentation complete? Yes   Offered [facility] -- Bedside Delivery? n/a   Discharge/Hospital Encounter Summary to ([facility]) PCP n/a   Referral to Outpatient Case Management complete? n/a   Referral to / orders for Home Health Complete? n/a   30 day supply of medicines given at discharge, if documented non-compliance / non-adherence? n/a   Any social issues identified prior to discharge? No   Did you assess the readiness or willingness of the family or caregiver to support self management of care? Yes

## 2017-07-06 NOTE — NURSING
Discharge instructions discussed with pt and pt's daughter. Verbalizes understanding. Paperwork and prescription given to pt. Medications reconciled. IV's D/C'd, catheters intact. Tolerated well. Pt informed that showering is okay, but to not submerge incision. Vital signs stable on RA. No acute distress noted. Awaiting transport.

## 2017-07-06 NOTE — PLAN OF CARE
No d/c needs identified at this time. Sw will continue to follow.      Missy Mares, MARYLU [id number]

## 2017-07-06 NOTE — PLAN OF CARE
Problem: Patient Care Overview  Goal: Plan of Care Review  Outcome: Ongoing (interventions implemented as appropriate)  Plan of care reviewed with pt and daughter. Pt and daughter verbalized undestanding. AAOx4. VSS on room air. Telemetry running afib. Ambulating down hallways. Denied pain. Tolerating diet. Denied n/v. Remained free from falls or injuries. Call light within reach. Will call for assistance. No distress noted. Will continue to monitor.

## 2017-07-07 NOTE — ASSESSMENT & PLAN NOTE
87 yo M s/p right hemicolectomy for adenocarcinoma. Surgery complicated by difficult jon placement intraoperatively now presents to ED with hematuria.     - Post void bladder scan in ED was 45, urine appears light pink.  - Patient appears to have a distal urethral mucosal injury that will likely heal over time. No jon necessary at this time. He was instructed that should he ever become unable to pass urine or empty his bladder then he should come back to the ED or call the urology clinic. He and his wife voiced understanding.

## 2017-07-07 NOTE — ED TRIAGE NOTES
Pt co blood in his urine. Blood noted in urine, had half of his colon removed Monday for colon cx and cathed in operating room. Blood in urine has not cleared since then.

## 2017-07-07 NOTE — SUBJECTIVE & OBJECTIVE
No past medical history on file.    No past surgical history on file.    Review of patient's allergies indicates:  No Known Allergies    Family History     None          Social History Main Topics    Smoking status: Never Smoker    Smokeless tobacco: Never Used    Alcohol use Not on file    Drug use: Unknown    Sexual activity: Not on file       Review of Systems   Constitutional: Negative for appetite change, chills and fever.   HENT: Negative.    Eyes: Negative.    Respiratory: Negative for shortness of breath.    Cardiovascular: Negative for chest pain.   Gastrointestinal: Positive for abdominal pain. Negative for nausea and vomiting.        He is POD3 from open right colectomy   Genitourinary: Positive for dysuria and hematuria. Negative for difficulty urinating, frequency, testicular pain and urgency.   Musculoskeletal: Negative.    Skin: Negative.    Neurological: Negative.        Objective:     Temp:  [96.4 °F (35.8 °C)-99 °F (37.2 °C)] 99 °F (37.2 °C)  Pulse:  [58-84] 84  Resp:  [16-18] 16  SpO2:  [93 %-97 %] 96 %  BP: (139-159)/(67-76) 159/76     Body mass index is 23.04 kg/m².            Drains          No matching active lines, drains, or airways          Physical Exam   Constitutional: He appears well-developed and well-nourished. No distress.   HENT:   Head: Normocephalic and atraumatic.   Eyes: EOM are normal. No scleral icterus.   Neck: Normal range of motion. Neck supple.   Cardiovascular: Normal rate and regular rhythm.    Pulmonary/Chest: Effort normal and breath sounds normal. No respiratory distress.   Abdominal: Soft. There is tenderness. There is no rebound and no guarding.   Ecchymoses and tenderness at incision site  Incision c/d/i  No suprapubic distention   Genitourinary: Penis normal.   Genitourinary Comments: Circumcised penis, pain with palpation of distal shaft/urethra. Bright red blood able to be expressed out of meatus.   Benign scrotal exam.         Significant  Labs:    BMP:    Recent Labs  Lab 07/04/17  0429 07/05/17 0447 07/06/17  0416   * 135* 136   K 4.2 4.1 4.1    110 111*   CO2 21* 20* 22*   BUN 19 17 15   CREATININE 1.1 0.9 0.8   CALCIUM 9.1 9.1 9.1       CBC:    Recent Labs  Lab 07/04/17 0429 07/05/17 0447 07/06/17 0416   WBC 7.12 5.97 6.03   HGB 11.7* 10.6* 10.2*   HCT 35.8* 32.6* 31.4*    216 206       All pertinent labs results from the past 24 hours have been reviewed.    Significant Imaging:  All pertinent imaging results/findings from the past 24 hours have been reviewed.

## 2017-07-07 NOTE — TELEPHONE ENCOUNTER
"  Reason for Disposition   [1] Blood from end of penis AND [2] large amount    Answer Assessment - Initial Assessment Questions  1. SYMPTOM: "What's the main symptom you're concerned about?" (e.g., discharge from penis, rash, pain, itching, swelling)      Uncontrolled bleeding for attempted jon insertion   2. LOCATION: "Where is the _______ located?"      Penis   3. ONSET: "When did ________  start?"      Today   4. PAIN: "Is there any pain?" If so, ask: "How bad is it?"  (Scale 1-10; or mild, moderate, severe)      unasnswered   5. URINE: "Any difficulty passing urine?" If so, ask: "When was the last time?"      Initial cause  6. CAUSE: "What do you think is causing the symptoms?"      Unsuccessful attempts to insert jon   7. OTHER SYMPTOMS: "Do you have any other symptoms?" (e.g., fever, abdominal pain, blood in urine)      --    Protocols used: ST PENIS AND SCROTUM SYMPTOMS-A-AH    "

## 2017-07-07 NOTE — ED PROVIDER NOTES
Encounter Date: 7/6/2017    SCRIBE #1 NOTE: I, Melonie Quezada, am scribing for, and in the presence of,  Dr. Miramontes. I have scribed the following portions of the note - the Resident attestation.       History     Chief Complaint   Patient presents with    Hematuria     After having catheter placed for surgery     Sushant Cruz is a 88 y.o. male with pmhx of colon cancer s/p hemicolectomy, BPH, surgery complicated by traumatic jon insertion who presents to the ED with c/o hematuria x 1 day.  Pt states surgery was complicated by difficult jon insertion for which urology was consulted and placed a jon under cystoscopy.  Today, was discharged and states had small amount of penile bleeding however was able to urinate without difficulty.  Later today upon arrival home, noted recurrence of penile bleeding and gross hematuria.  Associated symptoms include some dysuria and penile discomfort.  Not currently on blood thinners or antiplatelet agents (on xarelto for AF, currently held for surgery).  Denies inability to urinate, passage of gross clots, or other complaints.  No fevers/chills, chest pain, dyspnea, nausea/vomiting, or other complaints.           Review of patient's allergies indicates:  No Known Allergies  No past medical history on file.  No past surgical history on file.  No family history on file.  Social History   Substance Use Topics    Smoking status: Never Smoker    Smokeless tobacco: Never Used    Alcohol use Not on file     Review of Systems   Constitutional: Negative for chills and fever.   HENT: Negative for sore throat and trouble swallowing.    Eyes: Negative for photophobia and visual disturbance.   Respiratory: Negative for cough and shortness of breath.    Cardiovascular: Negative for chest pain and palpitations.   Gastrointestinal: Positive for abdominal pain (improving, post op pain per pt). Negative for nausea and vomiting.   Genitourinary: Positive for dysuria, hematuria and penile  pain. Negative for decreased urine volume, flank pain, penile swelling, scrotal swelling, testicular pain and urgency.   Musculoskeletal: Negative for arthralgias and myalgias.   Skin: Negative for rash and wound.   Neurological: Negative for weakness and light-headedness.   Psychiatric/Behavioral: Negative for agitation and confusion.   All other systems reviewed and are negative.      Physical Exam     Initial Vitals [07/06/17 2009]   BP Pulse Resp Temp SpO2   (!) 159/76 84 16 99 °F (37.2 °C) 96 %      MAP       103.67         Physical Exam    Nursing note and vitals reviewed.  Constitutional: He appears well-developed and well-nourished. He is not diaphoretic. No distress.   HENT:   Head: Normocephalic and atraumatic.   Eyes: EOM are normal. Pupils are equal, round, and reactive to light. Right eye exhibits no discharge. Left eye exhibits no discharge.   Neck: Normal range of motion. Neck supple. No JVD present.   Cardiovascular: Regular rhythm and intact distal pulses. Exam reveals no friction rub.    No murmur heard.  Pulmonary/Chest: Breath sounds normal. He has no wheezes. He has no rhonchi. He has no rales.   Abdominal: Soft. Bowel sounds are normal. He exhibits no distension. There is no tenderness.   Genitourinary: Circumcised.   Genitourinary Comments: +Scant bleeding from meatus; pt able to urinate, significant for bright red hematuria, no clots in urinal   Musculoskeletal: Normal range of motion. He exhibits no edema or tenderness.   Neurological: He is alert and oriented to person, place, and time. He has normal strength. No cranial nerve deficit.   Skin: Skin is warm and dry. No pallor.         ED Course   Procedures  Labs Reviewed - No data to display          Medical Decision Making:   History:   Old Medical Records: I decided to obtain old medical records.  Clinical Tests:   Lab Tests: Ordered and Reviewed  Other:   I have discussed this case with another health care provider.       APC / Resident  Notes:   U EM resident note:  88 y.o. M with hematuria and penile bleeding 2/2 traumatic jon insertion.  Urine here c/w gross hematuria, no clots.  Consulted urology for jon replacement given difficult insertion previously with need to place under cystoscopy over guidewire.  Dispo pending their evaluation, anticipate likely discharge with jon in place and close urology follow up pending their evaluation, unless need arises for replacement in the OR.    Erin Godinez  [facility] Internal Medicine-Emergency Medicine HOV  07/06/2017  8:58 PM         Scribe Attestation:   Scribe #1: I performed the above scribed service and the documentation accurately describes the services I performed. I attest to the accuracy of the note.    Attending Attestation:   Physician Attestation Statement for Resident:  As the supervising MD   Physician Attestation Statement: I have personally seen and examined this patient.   I agree with the above history. -: 89 yo male with traumatic jon insertion presenting to ED with hematuria.Medical records reviewed which showed that pt required cystoscopy with a guidewire. Will consult urology.     Urology consult at, evaluated the patient, they don't think he requires Jon catheter placement.  He is urinating with a strong stream with no evidence of retention.  His post void residual initially was 125, after urination again for the second time was 45.  Patient given strict return precautions.   As the supervising MD I agree with the above PE.    As the supervising MD I agree with the above treatment, course, plan, and disposition.          Physician Attestation for Scribe:  Physician Attestation Statement for Scribe #1: I, Dr. Miramontes, reviewed documentation, as scribed by Melonie Quezada in my presence, and it is both accurate and complete.                 ED Course     Clinical Impression:   The encounter diagnosis was Hematuria.    Disposition:   Disposition: Discharged  Condition:  Jose Miramontes MD  07/06/17 1216

## 2017-07-07 NOTE — CONSULTS
[facility]  Urology  Consult Note    Patient Name: Sushant Cruz  MRN: [id number]  Admission Date: 7/6/2017  Hospital Length of Stay: 0   Code Status: No Order   Attending Provider: Dr. Michael Loredo  Consulting Provider: Juan Hutchins MD  Primary Care Physician: Bentley Lyon MD  Principal Problem:<principal problem not specified>    Consults    Subjective:     HPI:  Sushant Cruz is a 88 y.o. male with pmhx of BPH s/p TURP who was recently admitted to [facility] for a right colectomy due to ascending colon adenocarcinoma on 7/3/17. Urology was consulted for difficult jon intraoperatively. The jon was passed over a wire using a cystoscope. The patient's jon was removed on POD1 and there was no trouble with voiding or hematuria. His wife said his bleeding began once they were discharged home today. She stated his urine would be pink and turn to bright red drops at his meatus once he finished voiding. He feels he is completely emptying his bladder and he only experiences a slight burn in his distal urethra when he voids. Not having frequency or urgency.   He is not currently on anticoagulation. However he was to restart his Xarelto 5 days after surgery due to his Afib. No fevers, chills, chest pain, sob, nausea, vomiting.      No past medical history on file.    No past surgical history on file.    Review of patient's allergies indicates:  No Known Allergies    Family History     None          Social History Main Topics    Smoking status: Never Smoker    Smokeless tobacco: Never Used    Alcohol use Not on file    Drug use: Unknown    Sexual activity: Not on file       Review of Systems   Constitutional: Negative for appetite change, chills and fever.   HENT: Negative.    Eyes: Negative.    Respiratory: Negative for shortness of breath.    Cardiovascular: Negative for chest pain.   Gastrointestinal: Positive for abdominal pain. Negative for nausea and vomiting.        He is POD3 from open right  colectomy   Genitourinary: Positive for dysuria and hematuria. Negative for difficulty urinating, frequency, testicular pain and urgency.   Musculoskeletal: Negative.    Skin: Negative.    Neurological: Negative.        Objective:     Temp:  [96.4 °F (35.8 °C)-99 °F (37.2 °C)] 99 °F (37.2 °C)  Pulse:  [58-84] 84  Resp:  [16-18] 16  SpO2:  [93 %-97 %] 96 %  BP: (139-159)/(67-76) 159/76     Body mass index is 23.04 kg/m².            Drains          No matching active lines, drains, or airways          Physical Exam   Constitutional: He appears well-developed and well-nourished. No distress.   HENT:   Head: Normocephalic and atraumatic.   Eyes: EOM are normal. No scleral icterus.   Neck: Normal range of motion. Neck supple.   Cardiovascular: Normal rate and regular rhythm.    Pulmonary/Chest: Effort normal and breath sounds normal. No respiratory distress.   Abdominal: Soft. There is tenderness. There is no rebound and no guarding.   Ecchymoses and tenderness at incision site  Incision c/d/i  No suprapubic distention   Genitourinary: Penis normal.   Genitourinary Comments: Circumcised penis, pain with palpation of distal shaft/urethra. Bright red blood able to be expressed out of meatus.   Benign scrotal exam.         Significant Labs:    BMP:    Recent Labs  Lab 07/04/17 0429 07/05/17 0447 07/06/17 0416   * 135* 136   K 4.2 4.1 4.1    110 111*   CO2 21* 20* 22*   BUN 19 17 15   CREATININE 1.1 0.9 0.8   CALCIUM 9.1 9.1 9.1       CBC:    Recent Labs  Lab 07/04/17 0429 07/05/17 0447 07/06/17  0416   WBC 7.12 5.97 6.03   HGB 11.7* 10.6* 10.2*   HCT 35.8* 32.6* 31.4*    216 206       All pertinent labs results from the past 24 hours have been reviewed.    Significant Imaging:  All pertinent imaging results/findings from the past 24 hours have been reviewed.                    Assessment and Plan:     Hematuria    87 yo M s/p right hemicolectomy for adenocarcinoma. Surgery complicated by difficult  jon placement intraoperatively now presents to ED with hematuria.     - Post void bladder scan in ED was 45, urine appears light pink.  - Patient appears to have a distal urethral mucosal injury that will likely heal over time. No jon necessary at this time. He was instructed that should he ever become unable to pass urine or empty his bladder then he should come back to the ED or call the urology clinic. He and his wife voiced understanding.             VTE Risk Mitigation     None          Juan Hutchins MD  Urology  [facility]

## 2017-07-07 NOTE — TELEPHONE ENCOUNTER
Patient had unsuccessfully attempts of jon insertion; experiencing large amount of penal bleeding.

## 2017-07-11 ENCOUNTER — PATIENT MESSAGE (OUTPATIENT)
Dept: SURGERY | Facility: CLINIC | Age: 82
End: 2017-07-11

## 2017-07-19 ENCOUNTER — OFFICE VISIT (OUTPATIENT)
Dept: SURGERY | Facility: CLINIC | Age: 82
End: 2017-07-19
Payer: MEDICARE

## 2017-07-19 VITALS
HEART RATE: 100 BPM | SYSTOLIC BLOOD PRESSURE: 105 MMHG | HEIGHT: 69 IN | WEIGHT: 145.31 LBS | DIASTOLIC BLOOD PRESSURE: 55 MMHG | BODY MASS INDEX: 21.52 KG/M2

## 2017-07-19 DIAGNOSIS — I48.20 CHRONIC ATRIAL FIBRILLATION: Primary | ICD-10-CM

## 2017-07-19 DIAGNOSIS — Z85.038 HISTORY OF COLON CANCER, STAGE II: ICD-10-CM

## 2017-07-19 PROBLEM — C18.2 CANCER OF RIGHT COLON: Status: RESOLVED | Noted: 2017-07-03 | Resolved: 2017-07-19

## 2017-07-19 PROCEDURE — 99999 PR PBB SHADOW E&M-EST. PATIENT-LVL III: CPT | Mod: PBBFAC,,, | Performed by: COLON & RECTAL SURGERY

## 2017-07-19 PROCEDURE — 99213 OFFICE O/P EST LOW 20 MIN: CPT | Mod: PBBFAC | Performed by: COLON & RECTAL SURGERY

## 2017-07-19 PROCEDURE — 99024 POSTOP FOLLOW-UP VISIT: CPT | Mod: ,,, | Performed by: COLON & RECTAL SURGERY

## 2017-07-19 NOTE — PROGRESS NOTES
Patient ID:  Sushant Cruz is a 88 y.o. male     Chief Complaint:   Chief Complaint   Patient presents with    Follow-up    Colon Cancer        HPI: 7/3/17 Right colectomy for T3N0 adenocarcinoma. Some loose stools otherwise OK  Discussed at MDT. REcommended observatiuon. Patient agrees     Cancer at hepatic flexure. C-scope 6/23/17 for iron def anemia. Found to have a mass ascending colon biopsy inv adenoca in villous adenoma.  2 small poiyps removed    CT scan 6/14/17 no evidence of mets. Sigmoid colon in left inguinal hernia. Pulmonary nodule recommend f/u in 3 months.    Had r inguinal hernia in 2016 with small bowel resection for incarceration.    Cardiac clearance. On Elaquis for A fib    ROS:        Constitutional: No fever, chills, activity or appetite change.      HENT: No hearing loss, facial swelling, neck pain or stiffness.       Eyes: No discharge, itching and visual disturbance.      Respiratory: No apnea, cough, choking or shortness of breath.       Cardiovascular: No leg swelling or chest pain      Gastrointestinal: No abdominal distention or change in bowel habbits     Genitourinary: No dysuria, frequency or flank pain.      Musculoskeletal: No arthralgias or gait problem.      Neurological: No dizziness, seizures or weakness.      Hematological: No adenopathy.      Psychiatric/Behavioral: No hallucinations or behavioral problems.       PE:    APPEARANCE: Well nourished, well developed, in no acute distress.   CHEST: Lungs clear. Normal respiratory effort.  CARDIOVASCULAR: Normal rhythm. No edema.  ABDOMEN: Soft. Healing close dincision  Rectum:  Normal skin,   Musculoskeletal: Symmetric, normal range of motion and strength.   Neurological: Alert and oriented to person, place, and time. Normal reflexes.   Skin: Skin is warm and dry.   Psychiatric: Normal mood and affect. Behavior is normal and appropriate.     Impression:  T3N0 colon cancer   PLAN:  RTC 3 months with CEA, Imodium PRN. Restart  Predaxa

## 2017-10-18 ENCOUNTER — OFFICE VISIT (OUTPATIENT)
Dept: SURGERY | Facility: CLINIC | Age: 82
End: 2017-10-18
Payer: MEDICARE

## 2017-10-18 VITALS
HEIGHT: 69 IN | BODY MASS INDEX: 21.52 KG/M2 | DIASTOLIC BLOOD PRESSURE: 70 MMHG | WEIGHT: 145.31 LBS | HEART RATE: 68 BPM | SYSTOLIC BLOOD PRESSURE: 144 MMHG

## 2017-10-18 DIAGNOSIS — Z85.038 HISTORY OF COLON CANCER, STAGE II: Primary | ICD-10-CM

## 2017-10-18 PROCEDURE — 99214 OFFICE O/P EST MOD 30 MIN: CPT | Mod: S$PBB,,, | Performed by: COLON & RECTAL SURGERY

## 2017-10-18 PROCEDURE — 99213 OFFICE O/P EST LOW 20 MIN: CPT | Mod: PBBFAC | Performed by: COLON & RECTAL SURGERY

## 2017-10-18 PROCEDURE — 99999 PR PBB SHADOW E&M-EST. PATIENT-LVL III: CPT | Mod: PBBFAC,,, | Performed by: COLON & RECTAL SURGERY

## 2017-10-18 NOTE — PROGRESS NOTES
Patient ID:  Sushant Cruz is a 88 y.o. male     Chief Complaint:   Chief Complaint   Patient presents with    Follow-up        HPI: Doing well no BMs.       7/3/17 Right colectomy for T3N0 adenocarcinoma. Some loose stools otherwise OK  Discussed at MDT. REcommended observatiuon. Patient agrees     Cancer at hepatic flexure. C-scope 6/23/17 for iron def anemia. Found to have a mass ascending colon biopsy inv adenoca in villous adenoma.  2 small poiyps removed    CT scan 6/14/17 no evidence of mets. Sigmoid colon in left inguinal hernia. Pulmonary nodule recommend f/u in 3 months.    Had r inguinal hernia in 2016 with small bowel resection for incarceration.    Cardiac clearance. On Elaquis for A fib    ROS:        Constitutional: No fever, chills, activity or appetite change.      HENT: No hearing loss, facial swelling, neck pain or stiffness.       Eyes: No discharge, itching and visual disturbance.      Respiratory: No apnea, cough, choking or shortness of breath.       Cardiovascular: No leg swelling or chest pain      Gastrointestinal: No abdominal distention or change in bowel habbits     Genitourinary: No dysuria, frequency or flank pain.      Musculoskeletal: No arthralgias or gait problem.      Neurological: No dizziness, seizures or weakness.      Hematological: No adenopathy.      Psychiatric/Behavioral: No hallucinations or behavioral problems.       PE:    APPEARANCE: Well nourished, well developed, in no acute distress.   CHEST: Lungs clear. Normal respiratory effort.  CARDIOVASCULAR: Normal rhythm. No edema.  ABDOMEN: Soft. Healing close dincision  Rectum:  Normal skin,   Musculoskeletal: Symmetric, normal range of motion and strength.   Neurological: Alert and oriented to person, place, and time. Normal reflexes.   Skin: Skin is warm and dry.   Psychiatric: Normal mood and affect. Behavior is normal and appropriate.     Impression:  T3N0 colon cancer.   PLAN:  RTC 3 months with CEA,

## 2018-02-01 ENCOUNTER — OFFICE VISIT (OUTPATIENT)
Dept: SURGERY | Facility: CLINIC | Age: 83
End: 2018-02-01
Payer: MEDICARE

## 2018-02-01 VITALS
WEIGHT: 156.06 LBS | HEART RATE: 71 BPM | SYSTOLIC BLOOD PRESSURE: 133 MMHG | BODY MASS INDEX: 23.12 KG/M2 | HEIGHT: 69 IN | DIASTOLIC BLOOD PRESSURE: 70 MMHG

## 2018-02-01 DIAGNOSIS — Z85.038 HX OF COLON CANCER, STAGE II: Primary | ICD-10-CM

## 2018-02-01 PROCEDURE — 99213 OFFICE O/P EST LOW 20 MIN: CPT | Mod: PBBFAC | Performed by: NURSE PRACTITIONER

## 2018-02-01 PROCEDURE — 99215 OFFICE O/P EST HI 40 MIN: CPT | Mod: S$PBB,,, | Performed by: NURSE PRACTITIONER

## 2018-02-01 PROCEDURE — 1159F MED LIST DOCD IN RCRD: CPT | Mod: ,,, | Performed by: NURSE PRACTITIONER

## 2018-02-01 PROCEDURE — 1126F AMNT PAIN NOTED NONE PRSNT: CPT | Mod: ,,, | Performed by: NURSE PRACTITIONER

## 2018-02-01 PROCEDURE — 99999 PR PBB SHADOW E&M-EST. PATIENT-LVL III: CPT | Mod: PBBFAC,,, | Performed by: NURSE PRACTITIONER

## 2018-02-05 NOTE — PROGRESS NOTES
Subjective:       Patient ID: Sushant Cruz is a 88 y.o. male who is a pt of Dr. Schwartz, he underwent RHC 7/5/17 for a stage 2 T3N0MX (19 negative nodes) colon cancer, last saw Dr. Schwartz 10/18/17.  He is doing well, eating well, gaining weight, energy level improving, has soft bm daily, no n/v, no abd pain, denies seeing blood in stool  0 score on Anxiety scale, says he does not even think about it, he is a retired  at [employer]  He was offered by Dr. Schwartz to discuss possible chemo with oncology but pt and Dr. Schwartz decided against consult  CEA 10/18/17 1.5     Chief Complaint: Follow-up    HPI  Review of Systems   Constitutional: Negative for activity change, appetite change, chills, diaphoresis, fatigue and fever.   Respiratory: Negative for apnea, cough, choking, chest tightness, shortness of breath and stridor.    Cardiovascular: Negative for palpitations and leg swelling.   Gastrointestinal: Negative for abdominal distention, abdominal pain, anal bleeding, blood in stool, constipation, diarrhea, nausea, rectal pain and vomiting.   Genitourinary: Negative for difficulty urinating, dysuria, flank pain, frequency and hematuria.   Musculoskeletal: Negative for arthralgias, back pain, gait problem and joint swelling.   Neurological: Negative for dizziness, facial asymmetry, light-headedness and headaches.   Hematological: Negative for adenopathy.   Psychiatric/Behavioral: Negative for agitation, behavioral problems, confusion, decreased concentration and dysphoric mood.       Objective:      Physical Exam   Constitutional: He is oriented to person, place, and time. He appears well-developed and well-nourished. No distress.   Neck: Normal range of motion. Neck supple. No thyromegaly present.   Cardiovascular: Normal rate, regular rhythm, normal heart sounds and intact distal pulses.    Pulmonary/Chest: Effort normal and breath sounds normal. No respiratory distress. He has no wheezes.   Abdominal:  Soft. Bowel sounds are normal. He exhibits no distension. There is no tenderness. There is no guarding.   Well healed abd inc line  No palpable nodes   Musculoskeletal: Normal range of motion. He exhibits no deformity.   Lymphadenopathy:     He has no cervical adenopathy.   Neurological: He is oriented to person, place, and time. A cranial nerve deficit is present.   Skin: Skin is warm and dry. No pallor.   Psychiatric: He has a normal mood and affect. His behavior is normal. Judgment and thought content normal.   Nursing note and vitals reviewed.      Assessment:       1. Hx of colon cancer, stage II        Plan:         Total visit time 40 min, more than 50% of which was spent in face to face counseling with pt regarding evalution and management and goals and treatment options for hx of colon cancer       Plan  Schedule CEA  Anticipate colonoscopy and CT scan one year anniversary of surgery  Fu 3 months

## 2018-04-10 ENCOUNTER — HOSPITAL ENCOUNTER (INPATIENT)
Facility: HOSPITAL | Age: 83
LOS: 2 days | Discharge: HOME | DRG: 389 | End: 2018-04-12
Attending: EMERGENCY MEDICINE | Admitting: COLON & RECTAL SURGERY
Payer: MEDICARE

## 2018-04-10 DIAGNOSIS — C18.2 MALIGNANT NEOPLASM OF ASCENDING COLON: ICD-10-CM

## 2018-04-10 DIAGNOSIS — R07.89 CHEST PRESSURE: ICD-10-CM

## 2018-04-10 DIAGNOSIS — K56.609 SBO (SMALL BOWEL OBSTRUCTION): Primary | ICD-10-CM

## 2018-04-10 PROBLEM — N13.30 HYDRONEPHROSIS: Status: ACTIVE | Noted: 2018-04-10

## 2018-04-10 LAB
ALBUMIN SERPL BCP-MCNC: 3.8 G/DL
ALP SERPL-CCNC: 89 U/L
ALT SERPL W/O P-5'-P-CCNC: 12 U/L
ANION GAP SERPL CALC-SCNC: 8 MMOL/L
AST SERPL-CCNC: 19 U/L
BACTERIA #/AREA URNS AUTO: NORMAL /HPF
BASOPHILS # BLD AUTO: 0.04 K/UL
BASOPHILS NFR BLD: 0.5 %
BILIRUB SERPL-MCNC: 1 MG/DL
BILIRUB UR QL STRIP: NEGATIVE
BNP SERPL-MCNC: 644 PG/ML
BUN SERPL-MCNC: 25 MG/DL
BUN SERPL-MCNC: 36 MG/DL (ref 6–30)
CALCIUM SERPL-MCNC: 10.9 MG/DL
CHLORIDE SERPL-SCNC: 104 MMOL/L
CHLORIDE SERPL-SCNC: 107 MMOL/L (ref 95–110)
CLARITY UR REFRACT.AUTO: CLEAR
CO2 SERPL-SCNC: 20 MMOL/L
COLOR UR AUTO: YELLOW
CREAT SERPL-MCNC: 1.5 MG/DL
CREAT SERPL-MCNC: 1.5 MG/DL (ref 0.5–1.4)
DIFFERENTIAL METHOD: ABNORMAL
EOSINOPHIL # BLD AUTO: 0 K/UL
EOSINOPHIL NFR BLD: 0.3 %
ERYTHROCYTE [DISTWIDTH] IN BLOOD BY AUTOMATED COUNT: 13.1 %
EST. GFR  (AFRICAN AMERICAN): 47.4 ML/MIN/1.73 M^2
EST. GFR  (NON AFRICAN AMERICAN): 41 ML/MIN/1.73 M^2
GLUCOSE SERPL-MCNC: 116 MG/DL
GLUCOSE SERPL-MCNC: 118 MG/DL (ref 70–110)
GLUCOSE UR QL STRIP: NEGATIVE
HCT VFR BLD AUTO: 43.4 %
HCT VFR BLD CALC: 43 %PCV (ref 36–54)
HGB BLD-MCNC: 14.2 G/DL
HGB UR QL STRIP: ABNORMAL
HYALINE CASTS UR QL AUTO: 0 /LPF
IMM GRANULOCYTES # BLD AUTO: 0.03 K/UL
IMM GRANULOCYTES NFR BLD AUTO: 0.4 %
KETONES UR QL STRIP: ABNORMAL
LEUKOCYTE ESTERASE UR QL STRIP: NEGATIVE
LIPASE SERPL-CCNC: 8 U/L
LYMPHOCYTES # BLD AUTO: 1.5 K/UL
LYMPHOCYTES NFR BLD: 19.5 %
MCH RBC QN AUTO: 32.6 PG
MCHC RBC AUTO-ENTMCNC: 32.7 G/DL
MCV RBC AUTO: 100 FL
MICROSCOPIC COMMENT: NORMAL
MONOCYTES # BLD AUTO: 0.7 K/UL
MONOCYTES NFR BLD: 9 %
NEUTROPHILS # BLD AUTO: 5.4 K/UL
NEUTROPHILS NFR BLD: 70.3 %
NITRITE UR QL STRIP: NEGATIVE
NRBC BLD-RTO: 0 /100 WBC
PH UR STRIP: 5 [PH] (ref 5–8)
PLATELET # BLD AUTO: 314 K/UL
PMV BLD AUTO: 10.5 FL
POC IONIZED CALCIUM: 1.02 MMOL/L (ref 1.06–1.42)
POC TCO2 (MEASURED): 22 MMOL/L (ref 23–29)
POTASSIUM BLD-SCNC: 6.9 MMOL/L (ref 3.5–5.1)
POTASSIUM SERPL-SCNC: 5 MMOL/L
PROT SERPL-MCNC: 8.2 G/DL
PROT UR QL STRIP: ABNORMAL
RBC # BLD AUTO: 4.35 M/UL
RBC #/AREA URNS AUTO: 3 /HPF (ref 0–4)
SAMPLE: ABNORMAL
SODIUM BLD-SCNC: 135 MMOL/L (ref 136–145)
SODIUM SERPL-SCNC: 132 MMOL/L
SP GR UR STRIP: 1.02 (ref 1–1.03)
SQUAMOUS #/AREA URNS AUTO: 0 /HPF
TROPONIN I SERPL DL<=0.01 NG/ML-MCNC: <0.006 NG/ML
URN SPEC COLLECT METH UR: ABNORMAL
UROBILINOGEN UR STRIP-ACNC: NEGATIVE EU/DL
WBC # BLD AUTO: 7.69 K/UL
WBC #/AREA URNS AUTO: 1 /HPF (ref 0–5)

## 2018-04-10 PROCEDURE — 85025 COMPLETE CBC W/AUTO DIFF WBC: CPT

## 2018-04-10 PROCEDURE — 99222 1ST HOSP IP/OBS MODERATE 55: CPT | Mod: GC,,, | Performed by: UROLOGY

## 2018-04-10 PROCEDURE — 84484 ASSAY OF TROPONIN QUANT: CPT

## 2018-04-10 PROCEDURE — 25500020 PHARM REV CODE 255: Performed by: EMERGENCY MEDICINE

## 2018-04-10 PROCEDURE — 25000003 PHARM REV CODE 250: Performed by: NURSE PRACTITIONER

## 2018-04-10 PROCEDURE — 96360 HYDRATION IV INFUSION INIT: CPT

## 2018-04-10 PROCEDURE — 93010 ELECTROCARDIOGRAM REPORT: CPT | Mod: ,,, | Performed by: INTERNAL MEDICINE

## 2018-04-10 PROCEDURE — 99222 1ST HOSP IP/OBS MODERATE 55: CPT | Mod: AI,,, | Performed by: NURSE PRACTITIONER

## 2018-04-10 PROCEDURE — 83690 ASSAY OF LIPASE: CPT

## 2018-04-10 PROCEDURE — 80053 COMPREHEN METABOLIC PANEL: CPT

## 2018-04-10 PROCEDURE — 11000001 HC ACUTE MED/SURG PRIVATE ROOM

## 2018-04-10 PROCEDURE — 93005 ELECTROCARDIOGRAM TRACING: CPT

## 2018-04-10 PROCEDURE — 99285 EMERGENCY DEPT VISIT HI MDM: CPT | Mod: ,,, | Performed by: PHYSICIAN ASSISTANT

## 2018-04-10 PROCEDURE — A4216 STERILE WATER/SALINE, 10 ML: HCPCS | Performed by: NURSE PRACTITIONER

## 2018-04-10 PROCEDURE — 25000003 PHARM REV CODE 250: Performed by: PHYSICIAN ASSISTANT

## 2018-04-10 PROCEDURE — 81001 URINALYSIS AUTO W/SCOPE: CPT

## 2018-04-10 PROCEDURE — 83880 ASSAY OF NATRIURETIC PEPTIDE: CPT

## 2018-04-10 PROCEDURE — 99285 EMERGENCY DEPT VISIT HI MDM: CPT | Mod: 25

## 2018-04-10 RX ORDER — RAMELTEON 8 MG/1
8 TABLET ORAL NIGHTLY PRN
Status: DISCONTINUED | OUTPATIENT
Start: 2018-04-10 | End: 2018-04-12 | Stop reason: HOSPADM

## 2018-04-10 RX ORDER — SODIUM CHLORIDE 0.9 % (FLUSH) 0.9 %
3 SYRINGE (ML) INJECTION EVERY 8 HOURS
Status: DISCONTINUED | OUTPATIENT
Start: 2018-04-10 | End: 2018-04-12 | Stop reason: HOSPADM

## 2018-04-10 RX ORDER — SODIUM CHLORIDE 9 MG/ML
INJECTION, SOLUTION INTRAVENOUS CONTINUOUS
Status: DISCONTINUED | OUTPATIENT
Start: 2018-04-10 | End: 2018-04-11

## 2018-04-10 RX ORDER — NALOXONE HCL 0.4 MG/ML
0.02 VIAL (ML) INJECTION
Status: DISCONTINUED | OUTPATIENT
Start: 2018-04-10 | End: 2018-04-12 | Stop reason: HOSPADM

## 2018-04-10 RX ORDER — FAMOTIDINE 10 MG/ML
20 INJECTION INTRAVENOUS DAILY
Status: DISCONTINUED | OUTPATIENT
Start: 2018-04-10 | End: 2018-04-12

## 2018-04-10 RX ORDER — HYDROCODONE BITARTRATE AND ACETAMINOPHEN 5; 325 MG/1; MG/1
1 TABLET ORAL EVERY 4 HOURS PRN
Status: DISCONTINUED | OUTPATIENT
Start: 2018-04-10 | End: 2018-04-12 | Stop reason: HOSPADM

## 2018-04-10 RX ORDER — PROMETHAZINE HYDROCHLORIDE 25 MG/1
25 TABLET ORAL EVERY 6 HOURS PRN
Status: DISCONTINUED | OUTPATIENT
Start: 2018-04-10 | End: 2018-04-12 | Stop reason: HOSPADM

## 2018-04-10 RX ADMIN — IOHEXOL 75 ML: 350 INJECTION, SOLUTION INTRAVENOUS at 02:04

## 2018-04-10 RX ADMIN — Medication 3 ML: at 10:04

## 2018-04-10 RX ADMIN — ALUMINUM HYDROXIDE, MAGNESIUM HYDROXIDE, AND SIMETHICONE 50 ML: 200; 200; 20 SUSPENSION ORAL at 01:04

## 2018-04-10 RX ADMIN — SODIUM CHLORIDE 500 ML: 9 INJECTION, SOLUTION INTRAVENOUS at 02:04

## 2018-04-10 RX ADMIN — SODIUM CHLORIDE: 0.9 INJECTION, SOLUTION INTRAVENOUS at 05:04

## 2018-04-10 RX ADMIN — SODIUM CHLORIDE 1000 ML: 0.9 INJECTION, SOLUTION INTRAVENOUS at 01:04

## 2018-04-10 NOTE — ASSESSMENT & PLAN NOTE
- Patient has right hydronephosis and GENE as well as SBO. Although imaging not available, per the family his hydronephrosis was not present on prior imaging when being worked up for colon cancer.   - GENE potentially due to obstruction vs dehydration/poor recent PO intake.   - Continue to monitor creatinine.  - No concerns for UTI.   - Patient can follow up as outpatient with urology for MAG3 renal scan for possible right UPJ obstruction.  - Mgmt of SBO per Dr. Schwartz's team.

## 2018-04-10 NOTE — ED NOTES
Pt informed that a urine sample is needed for a urinalysis. Pt verbalized understanding and was given a specimen cup in order to collect urine.

## 2018-04-10 NOTE — ED PROVIDER NOTES
Encounter Date: 4/10/2018    SCRIBE #1 NOTE: I, Bernadette Easton, am scribing for, and in the presence of, Kamari Aragon MD. the Resident attestation and the EKG reading.       History     Chief Complaint   Patient presents with    Abdominal Cramping     steady last night, nausea no vomiting, was belching     87 y/o male with history of Afib ( on Pradaxa and metoprolol) , colon cancer (s/p hemicolectomy 2017), presents to the ER with chief complaint of periumbilical abdominal pain and intermittent chest pressure ×4 days.  Patient reports that he initially began feeling fatigued with decreased appetite and intermittent abdominal cramping 3 days ago.  Patient reports that he has been belching to relieve pressure in his chest.  He took Tums 3 days ago without relief of his symptoms.    He denies associated shortness of breath, exertional chest pain, or any current pain.  Patient reports a soreness sensation above his umbilicus.  He had mild nausea this morning but denies vomiting, melena, bloody stool, dysuria, difficulty urinating, flank pain, or additional complaints at this time.           Review of patient's allergies indicates:  No Known Allergies  Past Medical History:   Diagnosis Date    Atrial fibrillation     Cancer     colon    Inguinal hernia     ruptured inguinal hernia     Past Surgical History:   Procedure Laterality Date    COLON SURGERY      HERNIA REPAIR       Family History   Problem Relation Age of Onset    Stroke Mother     Heart disease Father      Social History   Substance Use Topics    Smoking status: Never Smoker    Smokeless tobacco: Never Used    Alcohol use Yes      Comment: social     Review of Systems   Constitutional: Positive for fatigue. Negative for chills and fever.   HENT: Negative for sore throat.    Respiratory: Positive for cough. Negative for chest tightness, shortness of breath and wheezing.    Cardiovascular: Negative for chest pain.   Gastrointestinal: Positive for  abdominal pain and nausea. Negative for blood in stool, diarrhea and vomiting.   Genitourinary: Negative for dysuria.   Musculoskeletal: Negative for back pain.   Skin: Negative for rash.   Neurological: Negative for dizziness, syncope, weakness and light-headedness.   Hematological: Does not bruise/bleed easily.   Psychiatric/Behavioral: Negative for confusion.       Physical Exam     Initial Vitals [04/10/18 1127]   BP Pulse Resp Temp SpO2   (!) 161/72 76 18 98.1 °F (36.7 °C) 98 %      MAP       101.67         Physical Exam    Nursing note and vitals reviewed.  Constitutional: He appears well-developed and well-nourished. He is not diaphoretic. No distress.   HENT:   Head: Atraumatic.   Eyes: Conjunctivae and EOM are normal. Pupils are equal, round, and reactive to light.   Neck: Normal range of motion. Neck supple.   Cardiovascular: Normal rate, regular rhythm and intact distal pulses.   Pulmonary/Chest: Breath sounds normal. No respiratory distress. He has no wheezes. He has no rhonchi. He has no rales.   Abdominal: Soft. Bowel sounds are normal. He exhibits no distension. There is tenderness (mild RLQ). There is no rebound and no guarding.   Genitourinary: Rectal exam shows guaiac negative stool (brown stool). Guaiac negative stool (brown stool). : Acceptable.  Neurological: He is alert and oriented to person, place, and time. He has normal strength.   Skin: No rash noted.   Psychiatric: He has a normal mood and affect.         ED Course   Procedures  Labs Reviewed   URINALYSIS, REFLEX TO URINE CULTURE - Abnormal; Notable for the following:        Result Value    Protein, UA 2+ (*)     Ketones, UA Trace (*)     Occult Blood UA 1+ (*)     All other components within normal limits    Narrative:     Preferred Collection Type->Urine, Clean Catch   CBC W/ AUTO DIFFERENTIAL - Abnormal; Notable for the following:     RBC 4.35 (*)      (*)     MCH 32.6 (*)     All other components within normal  limits    Narrative:     ONE LAVENDER SHARED   COMPREHENSIVE METABOLIC PANEL - Abnormal; Notable for the following:     Sodium 132 (*)     CO2 20 (*)     Glucose 116 (*)     BUN, Bld 25 (*)     Creatinine 1.5 (*)     Calcium 10.9 (*)     eGFR if  47.4 (*)     eGFR if non  41.0 (*)     All other components within normal limits    Narrative:     ONE LAVENDER SHARED   B-TYPE NATRIURETIC PEPTIDE - Abnormal; Notable for the following:      (*)     All other components within normal limits    Narrative:     ONE LAVENDER SHARED   ISTAT PROCEDURE - Abnormal; Notable for the following:     POC Glucose 118 (*)     POC BUN 36 (*)     POC Creatinine 1.5 (*)     POC Sodium 135 (*)     POC Potassium 6.9 (*)     POC TCO2 (MEASURED) 22 (*)     POC Ionized Calcium 1.02 (*)     All other components within normal limits   LIPASE    Narrative:     ONE LAVENDER SHARED   TROPONIN I    Narrative:     ONE LAVENDER SHARED   URINALYSIS MICROSCOPIC    Narrative:     Preferred Collection Type->Urine, Clean Catch     EKG Readings: (Independently Interpreted)   Rhythm: Atrial Fibrillation. Heart Rate: 73. ST Segments: Normal ST Segments.   Left anterior fascicular block. Peaked T-wave in leads V3 and V4.      Imaging Results          CT Abdomen Pelvis With Contrast (Final result)  Result time 04/10/18 14:33:04    Final result by Ehsan Ibarra MD (04/10/18 14:33:04)                 Impression:      Low-grade small bowel obstruction with transition point in the right lower quadrant and trace ascites.  No free gas.    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  There is cortical thinning and delayed nephrostogram suggesting a chronic process.    Left inguinal hernia containing a loop of sigmoid colon without evidence of colonic obstruction.      Electronically signed by: Ehsan Ibarra MD  Date:    04/10/2018  Time:    14:33             Narrative:     EXAMINATION:  CT ABDOMEN PELVIS WITH CONTRAST    CLINICAL HISTORY:  Abdominal pain, unspecified;periumbilical abdominal pain, tenderness RLQ - history of appendectomy and hemicolectomy;    TECHNIQUE:  Low dose axial images, sagittal and coronal reformations were obtained from the lung bases to the pubic symphysis following the IV administration of 100 mL of Omnipaque 350 .  Oral contrast was not administered.    COMPARISON:  None.    FINDINGS:  ABDOMEN:    - Lung bases: No infiltrates and no nodules.    - Liver: Non-specific 9 mm hypodense lesion in segment 7.    - Gallbladder: No calcified gallstones.    - Bile Ducts: No evidence of intra or extra hepatic biliary ductal dilation.    - Spleen: Negative.    - Kidneys: There is bilateral cortical thinning, particularly on the right.  There is moderate right-sided hydronephrosis which may be related to UPJ obstruction.  Delayed nephrostogram on the right.  The distal right ureter does not appear dilated.  Left-sided renal cysts and nonobstructing left intrarenal calculus.  No solid renal masses.    - Adrenals: Unremarkable.    - Pancreas: No mass or peripancreatic fat stranding.    - Retroperitoneum:  No significant adenopathy.    - Vascular: No abdominal aortic aneurysm.  Tortuous aorta and iliac arteries with mild ectasia of the infrarenal aorta, without aneurysm or severe stenosis.    - Abdominal wall:  Unremarkable.    - Trace ascites.    PELVIS:    No pelvic mass, adenopathy.    BOWEL/MESENTERY:    The stomach is dilated as are multiple loops of small bowel throughout the abdomen.  There is a moderate-sized hiatal hernia.  There is a transition point noted in the right lower quadrant.  There has been prior right hemicolectomy.  There is colonic diverticulosis of the remaining colon with some inflammatory change in the pelvis, which is most likely related to the previously described obstruction..  There is a left inguinal hernia which contains a nonobstructed loop  of sigmoid colon.    BONES:  No acute osseous abnormality and no suspicious lytic or blastic lesion.                               X-Ray Chest PA And Lateral (Final result)  Result time 04/10/18 13:33:10    Final result by Yoko Nicole MD (04/10/18 13:33:10)                 Impression:      The source of the patient's chest pain is not established on this study.      Electronically signed by: Yoko Nicole MD  Date:    04/10/2018  Time:    13:33             Narrative:    EXAMINATION:  XR CHEST PA AND LATERAL    CLINICAL HISTORY:  Other chest pain    TECHNIQUE:  PA and lateral views of the chest were performed.    COMPARISON:  None    FINDINGS:  Lung volumes are normal and symmetric.  Mediastinal structures are midline allowing for slight left posterior oblique rotation.  Thoracic aorta is tortuous and atherosclerotic.    Hypertrophic changes are present at the 1st costochondral junctions.  Degenerative changes noted at the acromioclavicular joints.    Taking both views into account I detect no convincing evidence of pulmonary disease, pleural disease, lymph node enlargement, cardiac decompensation, pneumothorax, pneumomediastinum or pneumoperitoneum.  Multilevel degenerative changes are present in the thoracic spine.                                 Medical Decision Making:   History:   Old Medical Records: I decided to obtain old medical records.  Independently Interpreted Test(s):   I have ordered and independently interpreted EKG Reading(s) - see prior notes  Clinical Tests:   Lab Tests: Ordered and Reviewed  Radiological Study: Ordered and Reviewed  Medical Tests: Ordered and Reviewed       APC / Resident Notes:    Patient presents to the ER with chief complaint of periumbilical abdominal cramping and soreness ×4 days as well as intermittent chest pressure, which is improved with belching.  Patient did not have any pain at the time of my initial exam, but when laying on his left side his chest pressure  "returned and was relieved with sitting up.   He does not have associated shortness of breath, dyspnea on exertion or exertional chest pain.  Have considered but have low suspicion for ACS or CHF.  I do not suspect PE.    EKG shows Afib, rate of 52, no evidence of acute ischemia.   I have considered GERD, gastritis, pancreatitis diverticulitis, SBO, electrolyte abnormalities, dehydration, pneumonia.    Will order labs, chest xray , CT of the abdomen, will give GI cocktail, IV fluids, and reassess.    Patients labs reveal mild hyponatremia - 132.  Creatinine has worsened to 1.5 today compared to .8 nine months ago.  He has elevated BUN and ketones in urine so this is likely secondary to dehydration.  He is given 1.5 Liters IV fluids. BNP elevated to 644, Troponin is WNL.  He has no SOB and chest xray is negative for pleural effusion, or other acute or infectious process.      CT reveals " Low-grade small bowel obstruction with transition point in the right lower quadrant and trace ascites.  No free gas." The patient is also informed of incidental findings of moderate right hydro suspected to be chronic, and left inguinal hernia without obstruction.    I have discussed his presentation with gen. Surgery, will also discuss with colorectal surgery as the patient had a partial colectomy 6/ 28/17.   Patient will be admitted to colorectal surgery service.      Patient is comfortable with plan for admission, vital signs are stable and he has no current pain.       I discussed the care of this pt with my supervising MD.           Jose Martin Attestation:   Scribe #1: I performed the above scribed service and the documentation accurately describes the services I performed. I attest to the accuracy of the note.    Attending Attestation:     Physician Attestation Statement for NP/PA:   I discussed this assessment and plan of this patient with the NP/PA, but I did not personally examine the patient. The face to face encounter was " performed by the NP/PA.    Other NP/PA Attestation Additions:      Medical Decision Making: I am in agreement with my physician assistant's assessment, treatment, and plan of care.       Physician Attestation for Scribe:      Comments: I, Dr. Kamari Aragon, personally performed the services described in this documentation. All medical record entries made by the scribe were at my direction and in my presence.  I have reviewed the chart and agree that the record reflects my personal performance and is accurate and complete. Kamari Aragon MD.  5:36 PM 04/10/2018                 Clinical Impression:   The primary encounter diagnosis was SBO (small bowel obstruction). A diagnosis of Chest pressure was also pertinent to this visit.                           GINO Bran  04/10/18 1714       Kamari Aragon MD  04/10/18 1739

## 2018-04-10 NOTE — CONSULTS
[facility]  Urology  Consult Note    Patient Name: Sushant Cruz  MRN: [id number]  Admission Date: 4/10/2018  Hospital Length of Stay: 0   Code Status: Full Code   Attending Provider: Kamari Aragon MD   Consulting Provider: Neetu Hutchins MD  Primary Care Physician: Bentley Lyon MD  Principal Problem:<principal problem not specified>    Inpatient consult to Urology  Consult performed by: NEETU HUTCHINS  Consult ordered by: ABDIRASHID ESPINO          Subjective:     HPI:  89 yo male with history of atrial fibrillation (on Pradaxa), colon cancer (s/p open right colectomy by Dr. Schwartz in 7/2017) who presented to the ER earlier today with chief complaint of abdominal pain and chest pressure ×4 days. He says he has had a poor appetite since over this period and has been relatively fatigued. He has had nausea but no emesis. He denies fevers, flank pain, shortness of breath, difficulty voiding, hematuria, dysuria.     CT scan was performed which shows concern for small bowel obstruction with transition point in RLQ. There was an incidental finding of right hydronephrosis and what appears to be right UPJ obstruction. His creatinine is 1.5, up from a baseline of 0.9. No leukocytosis. UA not concerning for infection, 3RBCs.     He has a history of TURP by Dr. Hallman at [facility]. He has a remote history of kidney stones over 60 years ago. No episodes since.     Past Medical History:   Diagnosis Date    Atrial fibrillation     Cancer     colon    Inguinal hernia     ruptured inguinal hernia       Past Surgical History:   Procedure Laterality Date    COLON SURGERY      HERNIA REPAIR         Review of patient's allergies indicates:  No Known Allergies    Family History     Problem Relation (Age of Onset)    Heart disease Father    Stroke Mother          Social History Main Topics    Smoking status: Never Smoker    Smokeless tobacco: Never Used    Alcohol use Yes      Comment: social    Drug use: No     Sexual activity: No       Review of Systems   Constitutional: Positive for appetite change and fatigue. Negative for activity change, chills, fever and unexpected weight change.   HENT: Negative.    Eyes: Negative.    Respiratory: Negative for chest tightness and shortness of breath.    Cardiovascular: Negative for chest pain.   Gastrointestinal: Positive for abdominal pain and nausea. Negative for abdominal distention and vomiting.   Genitourinary: Positive for nocturia. Negative for difficulty urinating, flank pain, hematuria and urgency.   Musculoskeletal: Negative.    Skin: Negative.    Neurological: Negative.    Psychiatric/Behavioral: Negative.        Objective:     Temp:  [98.1 °F (36.7 °C)-98.3 °F (36.8 °C)] 98.3 °F (36.8 °C)  Pulse:  [66-85] 85  Resp:  [18] 18  SpO2:  [95 %-98 %] 95 %  BP: (133-173)/(72-79) 133/76     Body mass index is 22.89 kg/m².            Drains          No matching active lines, drains, or airways          Physical Exam   Constitutional: He appears well-developed and well-nourished. No distress.   HENT:   Head: Normocephalic and atraumatic.   Eyes: EOM are normal. No scleral icterus.   Cardiovascular: Normal rate and regular rhythm.    Pulmonary/Chest: Effort normal. No respiratory distress.   Abdominal: Soft. He exhibits no distension. There is no tenderness.   Colectomy scars well healed  RLQ tenderness  No right or left cvat   Musculoskeletal: He exhibits no edema.   Neurological: He is alert.   Skin: Skin is warm and dry.     Psychiatric: He has a normal mood and affect. His behavior is normal.       Significant Labs:    BMP:    Recent Labs  Lab 04/10/18  1246   *   K 5.0      CO2 20*   BUN 25*   CREATININE 1.5*   CALCIUM 10.9*       CBC:    Recent Labs  Lab 04/10/18  1246 04/10/18  1301   WBC 7.69  --    HGB 14.2  --    HCT 43.4 43     --        Urine Studies:   Recent Labs  Lab 04/10/18  1335   COLORU Yellow   APPEARANCEUA Clear   PHUR 5.0   SPECGRAV 1.025    PROTEINUA 2+*   GLUCUA Negative   KETONESU Trace*   BILIRUBINUA Negative   OCCULTUA 1+*   NITRITE Negative   UROBILINOGEN Negative   LEUKOCYTESUR Negative   RBCUA 3   WBCUA 1   BACTERIA Occasional   SQUAMEPITHEL 0   HYALINECASTS 0       Significant Imaging:  CT: I have reviewed all results within the past 24 hours and my personal findings are:  right hydronephrosis to the level of the UPJ potentially 2/2 a crossing vessel, no distal ureteral dilation, no stones on right, no left sided hydronephrosis. Inguinal hernia present, dilated loops of small bowel and stomach distention. Transition point appears to be in RLQ.       Assessment and Plan:     Hydronephrosis    - Patient has right hydronephosis and GENE as well as SBO. Although imaging not available, per the family his hydronephrosis was not present on prior imaging when being worked up for colon cancer.   - GENE potentially due to obstruction vs dehydration/poor recent PO intake.   - Continue to monitor creatinine.  - No concerns for UTI.   - Patient can follow up as outpatient with urology for MAG3 renal scan for possible right UPJ obstruction.  - Mgmt of SBO per Dr. Schwartz's team.               VTE Risk Mitigation         Ordered     Place DOUGLAS hose  Until discontinued      04/10/18 4579          Thank you for your consult. I will follow-up with patient. Please contact us if you have any additional questions.    Juan Hutchins MD  Urology  [facility]

## 2018-04-10 NOTE — HPI
88 year old male with hx of RHC for a stage 2 colon cancer in 2017 was admitted from ER with a psbo and right hydornephrosis

## 2018-04-10 NOTE — ED NOTES
Pt. Resting in bed in NAD in recliner 2. RR e/u. Pt. Offered bathroom assistance and denies need at this time. Explanation of care/wait provided. Pt and spouse verbalized understanding. Will continue to monitor.

## 2018-04-10 NOTE — ASSESSMENT & PLAN NOTE
88 year old male with hx of RHC7/3/17 for a stage 2 colon cancer, refused chemo post op now presents with psbo and new onset of right sided hydronephrosis now with return of bowel function    KUB, if improved start clears  Home meds  OOB/ambulate  DVT ppx  Pain control as needed, limit narcotics

## 2018-04-10 NOTE — SUBJECTIVE & OBJECTIVE
Subjective: pain improved. Had diarrhea and flatus overnight.    Review of patient's allergies indicates:  No Known Allergies    Past Medical History:   Diagnosis Date    Atrial fibrillation     Cancer     colon    Inguinal hernia     ruptured inguinal hernia     Past Surgical History:   Procedure Laterality Date    COLON SURGERY      HERNIA REPAIR       Family History     Problem Relation (Age of Onset)    Heart disease Father    Stroke Mother        Social History Main Topics    Smoking status: Never Smoker    Smokeless tobacco: Never Used    Alcohol use Yes      Comment: social    Drug use: No    Sexual activity: No     Review of Systems   Constitutional: Positive for appetite change. Negative for activity change, chills, fatigue and fever.   Respiratory: Negative for cough, chest tightness, shortness of breath and wheezing.    Cardiovascular: Negative for chest pain and leg swelling.   Gastrointestinal: Negative for abdominal distention, abdominal pain, anal bleeding, blood in stool, constipation, diarrhea, nausea, rectal pain and vomiting.   Genitourinary: Negative for difficulty urinating, enuresis, flank pain, frequency, genital sores and hematuria.   Musculoskeletal: Negative for back pain, gait problem and joint swelling.   Skin: Negative.  Negative for color change.   Neurological: Negative for dizziness, syncope and numbness.   Psychiatric/Behavioral: Negative for agitation, confusion, decreased concentration, dysphoric mood and hallucinations. The patient is not nervous/anxious and is not hyperactive.      Objective:     Vital Signs (Most Recent):  Temp: 98.3 °F (36.8 °C) (04/10/18 1512)  Pulse: 66 (04/10/18 1512)  Resp: 18 (04/10/18 1512)  BP: (!) 173/79 (04/10/18 1512)  SpO2: 97 % (04/10/18 1512) Vital Signs (24h Range):  Temp:  [98.1 °F (36.7 °C)-98.3 °F (36.8 °C)] 98.3 °F (36.8 °C)  Pulse:  [66-76] 66  Resp:  [18] 18  SpO2:  [97 %-98 %] 97 %  BP: (137-173)/(72-79) 173/79     Weight: 70.3 kg  (155 lb)  Body mass index is 22.89 kg/m².    Physical Exam   Constitutional: He is oriented to person, place, and time. He appears well-developed and well-nourished.   Cardiovascular: Normal rate, regular rhythm and normal heart sounds.    Pulmonary/Chest: Effort normal and breath sounds normal. No respiratory distress. He has no wheezes. He has no rales.   Abdominal: Soft. He exhibits no mass. There is no tenderness. There is no guarding.   abd slightly distended, soft, non tender   Musculoskeletal: Normal range of motion.   Neurological: He is alert and oriented to person, place, and time.   Skin: Skin is warm and dry.   Psychiatric: He has a normal mood and affect. His behavior is normal. Judgment and thought content normal.       Significant Labs:  BMP (Last 3 Results):   Recent Labs  Lab 04/10/18  1246   *   *   K 5.0      CO2 20*   BUN 25*   CREATININE 1.5*   CALCIUM 10.9*     CBC (Last 3 Results):   Recent Labs  Lab 04/10/18  1246 04/10/18  1301   WBC 7.69  --    RBC 4.35*  --    HGB 14.2  --    HCT 43.4 43     --    *  --    MCH 32.6*  --    MCHC 32.7  --        Significant Diagnostics: CT with question of transition point

## 2018-04-10 NOTE — HPI
89 yo male with history of atrial fibrillation (on Pradaxa), colon cancer (s/p open right colectomy by Dr. Schwartz in 7/2017) who presented to the ER earlier today with chief complaint of abdominal pain and chest pressure ×4 days. He says he has had a poor appetite since over this period and has been relatively fatigued. He has had nausea but no emesis. He denies fevers, flank pain, shortness of breath, difficulty voiding, hematuria, dysuria.     CT scan was performed which shows concern for small bowel obstruction with transition point in RLQ. There was an incidental finding of right hydronephrosis and what appears to be right UPJ obstruction. His creatinine is 1.5, up from a baseline of 0.9. No leukocytosis. UA not concerning for infection, 3RBCs.     He has a history of TURP by Dr. Hallman at [facility]. He has a remote history of kidney stones over 60 years ago. No episodes since.

## 2018-04-10 NOTE — SUBJECTIVE & OBJECTIVE
Past Medical History:   Diagnosis Date    Atrial fibrillation     Cancer     colon    Inguinal hernia     ruptured inguinal hernia       Past Surgical History:   Procedure Laterality Date    COLON SURGERY      HERNIA REPAIR         Review of patient's allergies indicates:  No Known Allergies    Family History     Problem Relation (Age of Onset)    Heart disease Father    Stroke Mother          Social History Main Topics    Smoking status: Never Smoker    Smokeless tobacco: Never Used    Alcohol use Yes      Comment: social    Drug use: No    Sexual activity: No       Review of Systems   Constitutional: Positive for appetite change and fatigue. Negative for activity change, chills, fever and unexpected weight change.   HENT: Negative.    Eyes: Negative.    Respiratory: Negative for chest tightness and shortness of breath.    Cardiovascular: Negative for chest pain.   Gastrointestinal: Positive for abdominal pain and nausea. Negative for abdominal distention and vomiting.   Genitourinary: Positive for nocturia. Negative for difficulty urinating, flank pain, hematuria and urgency.   Musculoskeletal: Negative.    Skin: Negative.    Neurological: Negative.    Psychiatric/Behavioral: Negative.        Objective:     Temp:  [98.1 °F (36.7 °C)-98.3 °F (36.8 °C)] 98.3 °F (36.8 °C)  Pulse:  [66-85] 85  Resp:  [18] 18  SpO2:  [95 %-98 %] 95 %  BP: (133-173)/(72-79) 133/76     Body mass index is 22.89 kg/m².            Drains          No matching active lines, drains, or airways          Physical Exam   Constitutional: He appears well-developed and well-nourished. No distress.   HENT:   Head: Normocephalic and atraumatic.   Eyes: EOM are normal. No scleral icterus.   Cardiovascular: Normal rate and regular rhythm.    Pulmonary/Chest: Effort normal. No respiratory distress.   Abdominal: Soft. He exhibits no distension. There is no tenderness.   Colectomy scars well healed  RLQ tenderness  No right or left cvat    Musculoskeletal: He exhibits no edema.   Neurological: He is alert.   Skin: Skin is warm and dry.     Psychiatric: He has a normal mood and affect. His behavior is normal.       Significant Labs:    BMP:    Recent Labs  Lab 04/10/18  1246   *   K 5.0      CO2 20*   BUN 25*   CREATININE 1.5*   CALCIUM 10.9*       CBC:    Recent Labs  Lab 04/10/18  1246 04/10/18  1301   WBC 7.69  --    HGB 14.2  --    HCT 43.4 43     --        Urine Studies:   Recent Labs  Lab 04/10/18  1335   COLORU Yellow   APPEARANCEUA Clear   PHUR 5.0   SPECGRAV 1.025   PROTEINUA 2+*   GLUCUA Negative   KETONESU Trace*   BILIRUBINUA Negative   OCCULTUA 1+*   NITRITE Negative   UROBILINOGEN Negative   LEUKOCYTESUR Negative   RBCUA 3   WBCUA 1   BACTERIA Occasional   SQUAMEPITHEL 0   HYALINECASTS 0       Significant Imaging:  CT: I have reviewed all results within the past 24 hours and my personal findings are:  right hydronephrosis to the level of the UPJ potentially 2/2 a crossing vessel, no distal ureteral dilation, no stones on right, no left sided hydronephrosis. Inguinal hernia present, dilated loops of small bowel and stomach distention. Transition point appears to be in RLQ.

## 2018-04-11 LAB
ANION GAP SERPL CALC-SCNC: 3 MMOL/L
BASOPHILS # BLD AUTO: 0.04 K/UL
BASOPHILS NFR BLD: 0.6 %
BUN SERPL-MCNC: 24 MG/DL
BUN SERPL-MCNC: 41 MG/DL (ref 6–30)
CALCIUM SERPL-MCNC: 9.4 MG/DL
CEA SERPL-MCNC: 1.8 NG/ML
CHLORIDE SERPL-SCNC: 106 MMOL/L (ref 95–110)
CHLORIDE SERPL-SCNC: 109 MMOL/L
CO2 SERPL-SCNC: 22 MMOL/L
CREAT SERPL-MCNC: 1.3 MG/DL
CREAT SERPL-MCNC: 1.6 MG/DL (ref 0.5–1.4)
DIFFERENTIAL METHOD: ABNORMAL
EOSINOPHIL # BLD AUTO: 0 K/UL
EOSINOPHIL NFR BLD: 0.6 %
ERYTHROCYTE [DISTWIDTH] IN BLOOD BY AUTOMATED COUNT: 13.3 %
EST. GFR  (AFRICAN AMERICAN): 56.3 ML/MIN/1.73 M^2
EST. GFR  (NON AFRICAN AMERICAN): 48.7 ML/MIN/1.73 M^2
GLUCOSE SERPL-MCNC: 105 MG/DL
GLUCOSE SERPL-MCNC: 121 MG/DL (ref 70–110)
HCT VFR BLD AUTO: 35.3 %
HCT VFR BLD CALC: 44 %PCV (ref 36–54)
HGB BLD-MCNC: 11.4 G/DL
IMM GRANULOCYTES # BLD AUTO: 0.02 K/UL
IMM GRANULOCYTES NFR BLD AUTO: 0.3 %
LYMPHOCYTES # BLD AUTO: 1.6 K/UL
LYMPHOCYTES NFR BLD: 23.4 %
MCH RBC QN AUTO: 32.9 PG
MCHC RBC AUTO-ENTMCNC: 32.3 G/DL
MCV RBC AUTO: 102 FL
MONOCYTES # BLD AUTO: 0.8 K/UL
MONOCYTES NFR BLD: 11 %
NEUTROPHILS # BLD AUTO: 4.4 K/UL
NEUTROPHILS NFR BLD: 64.1 %
NRBC BLD-RTO: 0 /100 WBC
PLATELET # BLD AUTO: 270 K/UL
PMV BLD AUTO: 10.2 FL
POC IONIZED CALCIUM: 1 MMOL/L (ref 1.06–1.42)
POC TCO2 (MEASURED): 28 MMOL/L (ref 23–29)
POTASSIUM BLD-SCNC: >9 MMOL/L (ref 3.5–5.1)
POTASSIUM SERPL-SCNC: 4.7 MMOL/L
PREALB SERPL-MCNC: 15 MG/DL
RBC # BLD AUTO: 3.47 M/UL
SAMPLE: ABNORMAL
SODIUM BLD-SCNC: 133 MMOL/L (ref 136–145)
SODIUM SERPL-SCNC: 134 MMOL/L
WBC # BLD AUTO: 6.8 K/UL

## 2018-04-11 PROCEDURE — 84134 ASSAY OF PREALBUMIN: CPT

## 2018-04-11 PROCEDURE — 36415 COLL VENOUS BLD VENIPUNCTURE: CPT

## 2018-04-11 PROCEDURE — 80048 BASIC METABOLIC PNL TOTAL CA: CPT

## 2018-04-11 PROCEDURE — S0028 INJECTION, FAMOTIDINE, 20 MG: HCPCS | Performed by: NURSE PRACTITIONER

## 2018-04-11 PROCEDURE — 25000003 PHARM REV CODE 250: Performed by: NURSE PRACTITIONER

## 2018-04-11 PROCEDURE — 82378 CARCINOEMBRYONIC ANTIGEN: CPT

## 2018-04-11 PROCEDURE — 85025 COMPLETE CBC W/AUTO DIFF WBC: CPT

## 2018-04-11 PROCEDURE — 99222 1ST HOSP IP/OBS MODERATE 55: CPT | Mod: AI,,, | Performed by: COLON & RECTAL SURGERY

## 2018-04-11 PROCEDURE — A4216 STERILE WATER/SALINE, 10 ML: HCPCS | Performed by: NURSE PRACTITIONER

## 2018-04-11 PROCEDURE — 11000001 HC ACUTE MED/SURG PRIVATE ROOM

## 2018-04-11 RX ORDER — DEXTROSE MONOHYDRATE, SODIUM CHLORIDE, AND POTASSIUM CHLORIDE 50; 2.24; 4.5 G/1000ML; G/1000ML; G/1000ML
INJECTION, SOLUTION INTRAVENOUS CONTINUOUS
Status: DISCONTINUED | OUTPATIENT
Start: 2018-04-11 | End: 2018-04-12 | Stop reason: HOSPADM

## 2018-04-11 RX ADMIN — Medication 3 ML: at 10:04

## 2018-04-11 RX ADMIN — POTASSIUM CHLORIDE, DEXTROSE MONOHYDRATE AND SODIUM CHLORIDE: 224; 5; 450 INJECTION, SOLUTION INTRAVENOUS at 11:04

## 2018-04-11 RX ADMIN — SODIUM CHLORIDE: 0.9 INJECTION, SOLUTION INTRAVENOUS at 03:04

## 2018-04-11 RX ADMIN — POTASSIUM CHLORIDE, DEXTROSE MONOHYDRATE AND SODIUM CHLORIDE: 224; 5; 450 INJECTION, SOLUTION INTRAVENOUS at 10:04

## 2018-04-11 RX ADMIN — FAMOTIDINE 20 MG: 10 INJECTION INTRAVENOUS at 08:04

## 2018-04-11 NOTE — PLAN OF CARE
CM completed discharge assessment and planning with patient. Patient verbalized understanding. Patient has good family support. Patient's daughter Nusrat([phone]) will provide transportation home. CM and SW will continue to follow for any additional needs.    PCP: Bentley Lyon MD     Pharmacy:   [facility] [address] [place], [place] - [address] AT [facility] [address]  [address]  [facility] [address]  Phone: [phone] Fax: [phone]      Payor: MEDICARE / Plan: MEDICARE PART A & B / Product Type: Government /      04/11/18 0851   Discharge Assessment   Assessment Type Discharge Planning Assessment   Confirmed/corrected address and phone number on facesheet? Yes   Assessment information obtained from? Patient   Communicated expected length of stay with patient/caregiver yes   Prior to hospitilization cognitive status: Alert/Oriented   Prior to hospitalization functional status: Independent   Current cognitive status: Alert/Oriented   Current Functional Status: Independent   Lives With child(andrew), adult  (daughter, Nusrat Foley ( [phone]))   Able to Return to Prior Arrangements yes   Is patient able to care for self after discharge? Yes   Who are your caregiver(s) and their phone number(s)? Nusrat Foley ( [phone])   Patient's perception of discharge disposition home or selfcare   Readmission Within The Last 30 Days no previous admission in last 30 days   Patient currently being followed by outpatient case management? No   Patient currently receives any other outside agency services? No   Equipment Currently Used at Home none   Do you have any problems affording any of your prescribed medications? TBD   Is the patient taking medications as prescribed? yes   Does the patient have transportation home? Yes   Transportation Available family or friend will provide   Dialysis Name and Scheduled days N/A   Does the patient receive services at the Coumadin  Clinic? No   Discharge Plan A Home;Home with family   Patient/Family In Agreement With Plan yes

## 2018-04-11 NOTE — PROGRESS NOTES
[facility]  Colorectal Surgery  Progress Note    Patient Name: Sushant Cruz  MRN: [id number]  Admission Date: 4/10/2018  Hospital Length of Stay: 1 days  Attending Physician: Robin Schwartz MD  Subjective: pain improved. Had diarrhea and flatus overnight.    Review of patient's allergies indicates:  No Known Allergies    Past Medical History:   Diagnosis Date    Atrial fibrillation     Cancer     colon    Inguinal hernia     ruptured inguinal hernia     Past Surgical History:   Procedure Laterality Date    COLON SURGERY      HERNIA REPAIR       Family History     Problem Relation (Age of Onset)    Heart disease Father    Stroke Mother        Social History Main Topics    Smoking status: Never Smoker    Smokeless tobacco: Never Used    Alcohol use Yes      Comment: social    Drug use: No    Sexual activity: No     Review of Systems   Constitutional: Positive for appetite change. Negative for activity change, chills, fatigue and fever.   Respiratory: Negative for cough, chest tightness, shortness of breath and wheezing.    Cardiovascular: Negative for chest pain and leg swelling.   Gastrointestinal: Negative for abdominal distention, abdominal pain, anal bleeding, blood in stool, constipation, diarrhea, nausea, rectal pain and vomiting.   Genitourinary: Negative for difficulty urinating, enuresis, flank pain, frequency, genital sores and hematuria.   Musculoskeletal: Negative for back pain, gait problem and joint swelling.   Skin: Negative.  Negative for color change.   Neurological: Negative for dizziness, syncope and numbness.   Psychiatric/Behavioral: Negative for agitation, confusion, decreased concentration, dysphoric mood and hallucinations. The patient is not nervous/anxious and is not hyperactive.      Objective:     Vital Signs (Most Recent):  Temp: 98.3 °F (36.8 °C) (04/10/18 1512)  Pulse: 66 (04/10/18 1512)  Resp: 18 (04/10/18 1512)  BP: (!) 173/79 (04/10/18 1512)  SpO2: 97 %  (04/10/18 1512) Vital Signs (24h Range):  Temp:  [98.1 °F (36.7 °C)-98.3 °F (36.8 °C)] 98.3 °F (36.8 °C)  Pulse:  [66-76] 66  Resp:  [18] 18  SpO2:  [97 %-98 %] 97 %  BP: (137-173)/(72-79) 173/79     Weight: 70.3 kg (155 lb)  Body mass index is 22.89 kg/m².    Physical Exam   Constitutional: He is oriented to person, place, and time. He appears well-developed and well-nourished.   Cardiovascular: Normal rate, regular rhythm and normal heart sounds.    Pulmonary/Chest: Effort normal and breath sounds normal. No respiratory distress. He has no wheezes. He has no rales.   Abdominal: Soft. He exhibits no mass. There is no tenderness. There is no guarding.   abd slightly distended, soft, non tender   Musculoskeletal: Normal range of motion.   Neurological: He is alert and oriented to person, place, and time.   Skin: Skin is warm and dry.   Psychiatric: He has a normal mood and affect. His behavior is normal. Judgment and thought content normal.       Significant Labs:  BMP (Last 3 Results):   Recent Labs  Lab 04/10/18  1246   *   *   K 5.0      CO2 20*   BUN 25*   CREATININE 1.5*   CALCIUM 10.9*     CBC (Last 3 Results):   Recent Labs  Lab 04/10/18  1246 04/10/18  1301   WBC 7.69  --    RBC 4.35*  --    HGB 14.2  --    HCT 43.4 43     --    *  --    MCH 32.6*  --    MCHC 32.7  --        Significant Diagnostics: CT with question of transition point    Assessment/Plan:     * SBO (small bowel obstruction)    88 year old male with hx of RHC7/3/17 for a stage 2 colon cancer, refused chemo post op now presents with psbo and new onset of right sided hydronephrosis now with return of bowel function    KUB, if improved start clears  Home meds  OOB/ambulate  DVT ppx  Pain control as needed, limit narcotics        Hydronephrosis    Outpatient workup per urology              Ed Gonzalez MD  Colorectal Surgery  [facility]

## 2018-04-11 NOTE — H&P
Sushant Cruz is a 88 y.o. male who is a pt of Dr. Schwartz, he underwent RHC 7/5/17 for a stage 2 T3N0MX (19 negative nodes) colon cancer, last saw Dr. Schwartz 10/18/17.  Had decreased BMs, and sbd distension. CAme to ER and is being admitted for partial SBO   Chief Complaint: Follow-up     HPI  Review of Systems   Constitutional: Negative for activity change, appetite change, chills, diaphoresis, fatigue and fever.   Respiratory: Negative for apnea, cough, choking, chest tightness, shortness of breath and stridor.    Cardiovascular: Negative for palpitations and leg swelling.   Gastrointestinal: Negative for abdominal distention, abdominal pain, anal bleeding, blood in stool, constipation, diarrhea, nausea, rectal pain and vomiting.   Genitourinary: Negative for difficulty urinating, dysuria, flank pain, frequency and hematuria.   Musculoskeletal: Negative for arthralgias, back pain, gait problem and joint swelling.   Neurological: Negative for dizziness, facial asymmetry, light-headedness and headaches.   Hematological: Negative for adenopathy.   Psychiatric/Behavioral: Negative for agitation, behavioral problems, confusion, decreased concentration and dysphoric mood.       Objective:   Physical Exam   Constitutional: He is oriented to person, place, and time. He appears well-developed and well-nourished. No distress.   Neck: Normal range of motion. Neck supple. No thyromegaly present.   Cardiovascular: Normal rate, regular rhythm, normal heart sounds and intact distal pulses.    Pulmonary/Chest: Effort normal and breath sounds normal. No respiratory distress. He has no wheezes.   Abdominal:  Soft, mild distention minimal tenderness suprapuibic area.. There is no guarding.   Well healed abd inc line  No palpable nodes   Musculoskeletal: Normal range of motion. He exhibits no deformity.   Lymphadenopathy:     He has no cervical adenopathy.   Neurological: He is oriented to person, place, and time. A cranial nerve  deficit is present.   Skin: Skin is warm and dry. No pallor.   Psychiatric: He has a normal mood and affect. His behavior is normal. Judgment and thought content normal.   Nursing note and vitals reviewed.    CVT scan mild thiuckeniong of sigloid colon anastomosis looks OK dilated small bowel Air in rectum      Assessment:     Partial SBO    PLAN: Admisison,. Hydration, bowel rest

## 2018-04-12 VITALS
SYSTOLIC BLOOD PRESSURE: 140 MMHG | DIASTOLIC BLOOD PRESSURE: 65 MMHG | TEMPERATURE: 98 F | BODY MASS INDEX: 23.7 KG/M2 | WEIGHT: 160 LBS | OXYGEN SATURATION: 96 % | HEART RATE: 65 BPM | RESPIRATION RATE: 18 BRPM | HEIGHT: 69 IN

## 2018-04-12 LAB
ANION GAP SERPL CALC-SCNC: 6 MMOL/L
BASOPHILS # BLD AUTO: 0.04 K/UL
BASOPHILS NFR BLD: 0.6 %
BUN SERPL-MCNC: 15 MG/DL
CALCIUM SERPL-MCNC: 9.4 MG/DL
CHLORIDE SERPL-SCNC: 113 MMOL/L
CO2 SERPL-SCNC: 18 MMOL/L
CREAT SERPL-MCNC: 1.3 MG/DL
DIFFERENTIAL METHOD: ABNORMAL
EOSINOPHIL # BLD AUTO: 0.1 K/UL
EOSINOPHIL NFR BLD: 1.4 %
ERYTHROCYTE [DISTWIDTH] IN BLOOD BY AUTOMATED COUNT: 13.3 %
EST. GFR  (AFRICAN AMERICAN): 56.3 ML/MIN/1.73 M^2
EST. GFR  (NON AFRICAN AMERICAN): 48.7 ML/MIN/1.73 M^2
GLUCOSE SERPL-MCNC: 110 MG/DL
HCT VFR BLD AUTO: 34.9 %
HGB BLD-MCNC: 11.4 G/DL
IMM GRANULOCYTES # BLD AUTO: 0.02 K/UL
IMM GRANULOCYTES NFR BLD AUTO: 0.3 %
LYMPHOCYTES # BLD AUTO: 1.4 K/UL
LYMPHOCYTES NFR BLD: 22.7 %
MCH RBC QN AUTO: 33.1 PG
MCHC RBC AUTO-ENTMCNC: 32.7 G/DL
MCV RBC AUTO: 102 FL
MONOCYTES # BLD AUTO: 0.7 K/UL
MONOCYTES NFR BLD: 10.6 %
NEUTROPHILS # BLD AUTO: 4.1 K/UL
NEUTROPHILS NFR BLD: 64.4 %
NRBC BLD-RTO: 0 /100 WBC
PLATELET # BLD AUTO: 232 K/UL
PMV BLD AUTO: 10.5 FL
POTASSIUM SERPL-SCNC: 4.5 MMOL/L
RBC # BLD AUTO: 3.44 M/UL
SODIUM SERPL-SCNC: 137 MMOL/L
WBC # BLD AUTO: 6.34 K/UL

## 2018-04-12 PROCEDURE — 36415 COLL VENOUS BLD VENIPUNCTURE: CPT

## 2018-04-12 PROCEDURE — 85025 COMPLETE CBC W/AUTO DIFF WBC: CPT

## 2018-04-12 PROCEDURE — 25000003 PHARM REV CODE 250: Performed by: NURSE PRACTITIONER

## 2018-04-12 PROCEDURE — S0028 INJECTION, FAMOTIDINE, 20 MG: HCPCS | Performed by: NURSE PRACTITIONER

## 2018-04-12 PROCEDURE — 99238 HOSP IP/OBS DSCHRG MGMT 30/<: CPT | Mod: ,,, | Performed by: NURSE PRACTITIONER

## 2018-04-12 PROCEDURE — 80048 BASIC METABOLIC PNL TOTAL CA: CPT

## 2018-04-12 RX ORDER — FAMOTIDINE 20 MG/1
20 TABLET, FILM COATED ORAL DAILY
Status: DISCONTINUED | OUTPATIENT
Start: 2018-04-13 | End: 2018-04-12 | Stop reason: HOSPADM

## 2018-04-12 RX ADMIN — FAMOTIDINE 20 MG: 10 INJECTION INTRAVENOUS at 08:04

## 2018-04-12 NOTE — NURSING
Patient verbalized understanding of discharge instructions. IV removed, catheter intact. No questions or concerns. Waiting for transport, will monitor.

## 2018-04-12 NOTE — PLAN OF CARE
Patient discharge to home. Staff nurse provided patient with follow up appointment instructions listed on AVS discharge instructions.     04/12/18 1116   Final Note   Assessment Type Final Discharge Note   Discharge Disposition Home

## 2018-04-12 NOTE — PROGRESS NOTES
Pharmacist Intervention IV to PO Note    Sushant Cruz is a 88 y.o. male being treated with IV medication famotidine    Patient Data:    Vital Signs (Most Recent):  Temp: 96.1 °F (35.6 °C) (04/12/18 0452)  Pulse: 71 (04/12/18 0452)  Resp: 18 (04/12/18 0452)  BP: (!) 160/76 (04/12/18 0452)  SpO2: 96 % (04/12/18 0452)   Vital Signs (72h Range):  Temp:  [96.1 °F (35.6 °C)-98.4 °F (36.9 °C)]   Pulse:  [55-87]   Resp:  [18-20]   BP: (128-180)/(56-86)   SpO2:  [95 %-99 %]      CBC:    Recent Labs     Lab 04/10/18  1246  04/10/18  1301 04/11/18  0645 04/12/18  0635   WBC 7.69 --  --  6.80 6.34   RBC 4.35* --  --  3.47* 3.44*   HGB 14.2 --  --  11.4* 11.4*   HCT 43.4 < > 43 35.3* 34.9*    --  --  270 232   * --  --  102* 102*   MCH 32.6* --  --  32.9* 33.1*   MCHC 32.7 --  --  32.3 32.7   < > = values in this interval not displayed.     CMP:     Recent Labs     Lab 04/10/18  1246 04/11/18  0645 04/12/18  0635   * 105 110   CALCIUM 10.9* 9.4 9.4   ALBUMIN 3.8 --  --    PROT 8.2 --  --    * 134* 137   K 5.0 4.7 4.5   CO2 20* 22* 18*    109 113*   BUN 25* 24* 15   CREATININE 1.5* 1.3 1.3   ALKPHOS 89 --  --    ALT 12 --  --    AST 19 --  --    BILITOT 1.0 --  --        Dietary Orders:  Diet Orders            Diet low fiber/residue: Low Fiber/Residue starting at 04/11 1328            Based on the following criteria, this patient qualifies for intravenous to oral conversion:  [x] The patients gastrointestinal tract is functioning (tolerating medications via oral or enteral route for 24 hours and tolerating food or enteral feeds for 24 hours).  [x] The patient is hemodynamically stable for 24 hours (heart rate <100 beats per minute, systolic blood pressure >99 mm Hg, and respiratory rate <20 breaths per minute).  [x] The patient shows clinical improvement (afebrile for at least 24 hours and white blood cell count downtrending or normalized). Additionally, the patient must be non-neutropenic  (absolute neutrophil count >500 cells/mm3).  [x] For antimicrobials, the patient has received IV therapy for at least 24 hours.    IV medication famotidine 20 mg daily will be changed to oral medication famotidine 20 mg daily    Pharmacist's Name: Chela Stephenson  Pharmacist's Extension: [phone]

## 2018-04-12 NOTE — DISCHARGE SUMMARY
[facility]  Colorectal Surgery  Discharge Summary      Patient Name: Sushant Cruz  MRN: [id number]  Admission Date: 4/10/2018  Hospital Length of Stay: 2 days  Discharge Date and Time: 4/12/2018 10:33 AM  Attending Physician: No att. providers found   Discharging Provider: Abdirashid Coreas NP  Primary Care Provider: Bentley Lyon MD     HPI:  88 year old male with hx of RHC for a stage 2 colon cancer in 2017 was admitted from ER with a psbo and right hydornephrosis    * No surgery found *     Hospital Course:  He was admitted and placed on bowel rest with IVF, urology consulted and suggested once obs resolved to fu with Dr. De Leon as outpt.  HD 2 he was passing flatus and during the course of the day he began to have stool and diet was advanced. HD 3 he is dewey low fiber diet, no abd pain, pos for flatus and stool, amb in [place] without diff, AF and VS stable, he will be discharged to fu 3 weeks with Dr. Schwartz and Dr. De Leon      Consults         Status Ordering Provider     Inpatient consult to Urology  Once     Provider:  (Not yet assigned)    ABDIRASHID Dillon          Significant Diagnostic Studies: Labs:   BMP:   Recent Labs  Lab 04/11/18  0645 04/12/18  0635    110   * 137   K 4.7 4.5    113*   CO2 22* 18*   BUN 24* 15   CREATININE 1.3 1.3   CALCIUM 9.4 9.4    and CBC   Recent Labs  Lab 04/11/18  0645 04/12/18  0635   WBC 6.80 6.34   HGB 11.4* 11.4*   HCT 35.3* 34.9*    232       Pending Diagnostic Studies:     None        Final Active Diagnoses:    Diagnosis Date Noted POA    PRINCIPAL PROBLEM:  SBO (small bowel obstruction) [K56.609] 04/10/2018 Yes    Hydronephrosis [N13.30] 04/10/2018 Unknown    Malignant neoplasm of ascending colon [C18.2] 06/28/2017 Yes    Chronic atrial fibrillation [I48.2] 06/28/2017 Yes      Problems Resolved During this Admission:    Diagnosis Date Noted Date Resolved POA      Discharged Condition: good    Disposition: Home or  Self Care    Follow Up:  Follow-up Information     Robin Schwartz MD In 3 weeks.    Specialty:  Colon and Rectal Surgery  Contact information:  [id number]  [facility] [id number] [phone]             Lisa De Leon MD In 3 weeks.    Specialty:  Urology  Contact information:  [address]  [facility] [id number] [phone]                 Patient Instructions:     Diet Adult Regular   Order Comments: Low fiber diet, no fresh fruit or fresh vegetables. No nuts, popcorn, grapes or raisins for approx 6 weeks     Lifting restrictions   Order Comments: No lifting anything greater than 5 pounds     Call MD for:  persistent nausea and vomiting or diarrhea     Call MD for:  severe uncontrolled pain     Call MD for:  redness, tenderness, or signs of infection (pain, swelling, redness, odor or green/yellow discharge around incision site)     Call MD for:  difficulty breathing or increased cough     Call MD for:  severe persistent headache     Call MD for:  worsening rash     Call MD for:  persistent dizziness, light-headedness, or visual disturbances     Call MD for:  increased confusion or weakness     Call MD for:   Order Comments: Call for temp above 101       Medications:  Reconciled Home Medications:      Medication List      CONTINUE taking these medications    cyanocobalamin 2000 MCG tablet  Take 2,000 mcg by mouth once daily.     ferrous sulfate 325 mg (65 mg iron) Tab tablet  Take 325 mg by mouth 3 (three) times daily.     metoprolol succinate 25 MG 24 hr tablet  Commonly known as:  TOPROL-XL  TK 1 T PO QD     PRADAXA ORAL  Take by mouth.            Aurora Coreas NP  Colorectal Surgery  [facility]

## 2018-04-12 NOTE — HOSPITAL COURSE
He was admitted and placed on bowel rest with IVF, urology consulted and suggested once obs resolved to fu with Dr. De Leon as outpt.  HD 2 he was passing flatus and during the course of the day he began to have stool and diet was advanced. HD 3 he is dewey low fiber diet, no abd pain, pos for flatus and stool, amb in [place]omas without diff, AF and VS stable, he will be discharged to fu 3 weeks with Dr. Schwartz and Dr. De Leon

## 2018-04-29 ENCOUNTER — HOSPITAL ENCOUNTER (INPATIENT)
Facility: HOSPITAL | Age: 83
LOS: 5 days | Discharge: HOME | DRG: 375 | End: 2018-05-04
Attending: EMERGENCY MEDICINE | Admitting: COLON & RECTAL SURGERY
Payer: MEDICARE

## 2018-04-29 DIAGNOSIS — C78.6 SECONDARY MALIGNANT PERITONEAL DEPOSIT: ICD-10-CM

## 2018-04-29 DIAGNOSIS — K56.609 SBO (SMALL BOWEL OBSTRUCTION): ICD-10-CM

## 2018-04-29 DIAGNOSIS — K56.609 SMALL BOWEL OBSTRUCTION: ICD-10-CM

## 2018-04-29 DIAGNOSIS — N17.9 AKI (ACUTE KIDNEY INJURY): ICD-10-CM

## 2018-04-29 DIAGNOSIS — R10.9 ABDOMINAL PAIN, UNSPECIFIED ABDOMINAL LOCATION: Primary | ICD-10-CM

## 2018-04-29 DIAGNOSIS — C18.2 MALIGNANT NEOPLASM OF ASCENDING COLON: ICD-10-CM

## 2018-04-29 DIAGNOSIS — C78.7 SECONDARY LIVER CANCER: ICD-10-CM

## 2018-04-29 LAB
ALBUMIN SERPL BCP-MCNC: 3.8 G/DL
ALP SERPL-CCNC: 82 U/L
ALT SERPL W/O P-5'-P-CCNC: 8 U/L
ANION GAP SERPL CALC-SCNC: 10 MMOL/L
AST SERPL-CCNC: 15 U/L
BASOPHILS # BLD AUTO: 0.04 K/UL
BASOPHILS NFR BLD: 0.6 %
BILIRUB SERPL-MCNC: 1.4 MG/DL
BILIRUB UR QL STRIP: NEGATIVE
BUN SERPL-MCNC: 25 MG/DL
BUN SERPL-MCNC: 28 MG/DL (ref 6–30)
CALCIUM SERPL-MCNC: 11.4 MG/DL
CEA SERPL-MCNC: 2.8 NG/ML
CHLORIDE SERPL-SCNC: 104 MMOL/L (ref 95–110)
CHLORIDE SERPL-SCNC: 105 MMOL/L
CLARITY UR REFRACT.AUTO: CLEAR
CO2 SERPL-SCNC: 22 MMOL/L
COLOR UR AUTO: YELLOW
CREAT SERPL-MCNC: 1.6 MG/DL
CREAT SERPL-MCNC: 1.6 MG/DL (ref 0.5–1.4)
DIFFERENTIAL METHOD: ABNORMAL
EOSINOPHIL # BLD AUTO: 0 K/UL
EOSINOPHIL NFR BLD: 0.6 %
ERYTHROCYTE [DISTWIDTH] IN BLOOD BY AUTOMATED COUNT: 13.2 %
EST. GFR  (AFRICAN AMERICAN): 43.5 ML/MIN/1.73 M^2
EST. GFR  (NON AFRICAN AMERICAN): 37.6 ML/MIN/1.73 M^2
GLUCOSE SERPL-MCNC: 100 MG/DL
GLUCOSE SERPL-MCNC: 102 MG/DL (ref 70–110)
GLUCOSE UR QL STRIP: NEGATIVE
HCT VFR BLD AUTO: 41.1 %
HCT VFR BLD CALC: 43 %PCV (ref 36–54)
HGB BLD-MCNC: 13.3 G/DL
HGB UR QL STRIP: ABNORMAL
IMM GRANULOCYTES # BLD AUTO: 0.04 K/UL
IMM GRANULOCYTES NFR BLD AUTO: 0.6 %
KETONES UR QL STRIP: ABNORMAL
LEUKOCYTE ESTERASE UR QL STRIP: NEGATIVE
LIPASE SERPL-CCNC: 9 U/L
LYMPHOCYTES # BLD AUTO: 1.2 K/UL
LYMPHOCYTES NFR BLD: 17.3 %
MCH RBC QN AUTO: 32.8 PG
MCHC RBC AUTO-ENTMCNC: 32.4 G/DL
MCV RBC AUTO: 102 FL
MICROSCOPIC COMMENT: NORMAL
MONOCYTES # BLD AUTO: 0.5 K/UL
MONOCYTES NFR BLD: 7.3 %
NEUTROPHILS # BLD AUTO: 5 K/UL
NEUTROPHILS NFR BLD: 73.6 %
NITRITE UR QL STRIP: NEGATIVE
NRBC BLD-RTO: 0 /100 WBC
PH UR STRIP: 5 [PH] (ref 5–8)
PLATELET # BLD AUTO: 325 K/UL
PMV BLD AUTO: 10.2 FL
POC IONIZED CALCIUM: 1.24 MMOL/L (ref 1.06–1.42)
POC TCO2 (MEASURED): 24 MMOL/L (ref 23–29)
POTASSIUM BLD-SCNC: 4.8 MMOL/L (ref 3.5–5.1)
POTASSIUM SERPL-SCNC: 4.9 MMOL/L
PROT SERPL-MCNC: 8.3 G/DL
PROT UR QL STRIP: NEGATIVE
RBC # BLD AUTO: 4.05 M/UL
RBC #/AREA URNS AUTO: 2 /HPF (ref 0–4)
SAMPLE: ABNORMAL
SODIUM BLD-SCNC: 139 MMOL/L (ref 136–145)
SODIUM SERPL-SCNC: 137 MMOL/L
SP GR UR STRIP: 1.01 (ref 1–1.03)
TROPONIN I SERPL DL<=0.01 NG/ML-MCNC: <0.006 NG/ML
URN SPEC COLLECT METH UR: ABNORMAL
UROBILINOGEN UR STRIP-ACNC: NEGATIVE EU/DL
WBC # BLD AUTO: 6.84 K/UL
WBC #/AREA URNS AUTO: 0 /HPF (ref 0–5)

## 2018-04-29 PROCEDURE — 80053 COMPREHEN METABOLIC PANEL: CPT

## 2018-04-29 PROCEDURE — 25500020 PHARM REV CODE 255: Performed by: EMERGENCY MEDICINE

## 2018-04-29 PROCEDURE — 25000003 PHARM REV CODE 250: Performed by: STUDENT IN AN ORGANIZED HEALTH CARE EDUCATION/TRAINING PROGRAM

## 2018-04-29 PROCEDURE — 84484 ASSAY OF TROPONIN QUANT: CPT

## 2018-04-29 PROCEDURE — 82378 CARCINOEMBRYONIC ANTIGEN: CPT

## 2018-04-29 PROCEDURE — 99285 EMERGENCY DEPT VISIT HI MDM: CPT | Mod: 25

## 2018-04-29 PROCEDURE — 25000003 PHARM REV CODE 250: Performed by: EMERGENCY MEDICINE

## 2018-04-29 PROCEDURE — 81001 URINALYSIS AUTO W/SCOPE: CPT

## 2018-04-29 PROCEDURE — 94761 N-INVAS EAR/PLS OXIMETRY MLT: CPT

## 2018-04-29 PROCEDURE — 85025 COMPLETE CBC W/AUTO DIFF WBC: CPT

## 2018-04-29 PROCEDURE — 99285 EMERGENCY DEPT VISIT HI MDM: CPT | Mod: ,,, | Performed by: EMERGENCY MEDICINE

## 2018-04-29 PROCEDURE — 20600001 HC STEP DOWN PRIVATE ROOM

## 2018-04-29 PROCEDURE — 93005 ELECTROCARDIOGRAM TRACING: CPT

## 2018-04-29 PROCEDURE — 93010 ELECTROCARDIOGRAM REPORT: CPT | Mod: ,,, | Performed by: INTERNAL MEDICINE

## 2018-04-29 PROCEDURE — 96360 HYDRATION IV INFUSION INIT: CPT

## 2018-04-29 PROCEDURE — 83690 ASSAY OF LIPASE: CPT

## 2018-04-29 RX ORDER — DEXTROSE MONOHYDRATE, SODIUM CHLORIDE, AND POTASSIUM CHLORIDE 50; 1.49; 4.5 G/1000ML; G/1000ML; G/1000ML
INJECTION, SOLUTION INTRAVENOUS CONTINUOUS
Status: DISCONTINUED | OUTPATIENT
Start: 2018-04-29 | End: 2018-04-30

## 2018-04-29 RX ORDER — METOPROLOL SUCCINATE 25 MG/1
25 TABLET, EXTENDED RELEASE ORAL
Status: COMPLETED | OUTPATIENT
Start: 2018-04-29 | End: 2018-04-29

## 2018-04-29 RX ORDER — ACETAMINOPHEN 325 MG/1
650 TABLET ORAL EVERY 8 HOURS PRN
Status: DISCONTINUED | OUTPATIENT
Start: 2018-04-29 | End: 2018-05-04 | Stop reason: HOSPADM

## 2018-04-29 RX ORDER — SODIUM CHLORIDE 0.9 % (FLUSH) 0.9 %
3 SYRINGE (ML) INJECTION
Status: DISCONTINUED | OUTPATIENT
Start: 2018-04-29 | End: 2018-05-04 | Stop reason: HOSPADM

## 2018-04-29 RX ORDER — HYDROMORPHONE HYDROCHLORIDE 1 MG/ML
1 INJECTION, SOLUTION INTRAMUSCULAR; INTRAVENOUS; SUBCUTANEOUS EVERY 4 HOURS PRN
Status: DISCONTINUED | OUTPATIENT
Start: 2018-04-29 | End: 2018-05-04 | Stop reason: HOSPADM

## 2018-04-29 RX ORDER — ONDANSETRON 8 MG/1
8 TABLET, ORALLY DISINTEGRATING ORAL EVERY 8 HOURS PRN
Status: DISCONTINUED | OUTPATIENT
Start: 2018-04-29 | End: 2018-05-04 | Stop reason: HOSPADM

## 2018-04-29 RX ADMIN — METOPROLOL SUCCINATE 25 MG: 25 TABLET, EXTENDED RELEASE ORAL at 03:04

## 2018-04-29 RX ADMIN — SODIUM CHLORIDE 1000 ML: 0.9 INJECTION, SOLUTION INTRAVENOUS at 12:04

## 2018-04-29 RX ADMIN — IOHEXOL 75 ML: 350 INJECTION, SOLUTION INTRAVENOUS at 06:04

## 2018-04-29 RX ADMIN — DEXTROSE MONOHYDRATE, SODIUM CHLORIDE, AND POTASSIUM CHLORIDE: 50; 4.5; 1.49 INJECTION, SOLUTION INTRAVENOUS at 10:04

## 2018-04-29 NOTE — ED NOTES
Pt. Resting safe in room drinking 2nd cup of contrast. Denies any current needs. Call light within reach. Family at bedside.

## 2018-04-29 NOTE — ASSESSMENT & PLAN NOTE
Crampy abdominal pain, reflux/belching and inability to tolerate PO along with decreased bowel function likely recurrent partial SBO.     - CT abd pelvis with PO and IV contrast  - IVF resuscitation  - Pending CT findings - likely needs admission to CRS with conservative management of pSBO    The above was discussed with Dr. Butts.

## 2018-04-29 NOTE — CONSULTS
[facility]  Colorectal Surgery  Consult Note    Patient Name: Sushant Cruz  MRN: [id number]  Admission Date: 4/29/2018  Hospital Length of Stay: 0 days  Attending Physician: Kamari Aragon MD  Primary Care Provider: Bentley Lyon MD    Consults  Subjective:     Chief Complaint/Reason for Admission: Anorexia, abdominal pain, constipation    History of Present Illness:  88 yo M with history of stage II colon cancer s/p right hemicolectomy in 2007 (Dr. Schwartz) who was recently admitted for pSBO - managed conservatively (discharged 4/12/18) and incidental right hydronephrosis who presents with decreased PO intake. He did not require an NG tube his last admission.     He reports he has had stable crampy abdominal pain since discharge. His last normal BM was Friday. He has had increased belching and inability to tolerate PO since Friday. He has not had any nausea or emesis. No worsening abdominal pain.     On arrival to ED, he is hemodynamically stable. WBC is normal.       (Not in a hospital admission)    Review of patient's allergies indicates:  No Known Allergies    Past Medical History:   Diagnosis Date    Atrial fibrillation     Cancer     colon    Inguinal hernia     ruptured inguinal hernia     Past Surgical History:   Procedure Laterality Date    COLON SURGERY      HERNIA REPAIR       Family History     Problem Relation (Age of Onset)    Heart disease Father    Stroke Mother        Social History Main Topics    Smoking status: Never Smoker    Smokeless tobacco: Never Used    Alcohol use Yes      Comment: social    Drug use: No    Sexual activity: No     Review of Systems   Constitutional: Positive for appetite change. Negative for chills, diaphoresis, fatigue and fever.   HENT: Negative for congestion.    Eyes: Negative for visual disturbance.   Respiratory: Negative for cough and shortness of breath.    Cardiovascular: Negative for chest pain.   Gastrointestinal: Positive for abdominal  pain (crampy intermittent) and constipation. Negative for blood in stool, diarrhea, nausea, rectal pain and vomiting.        Belching, reflux   Genitourinary: Negative for dysuria.   Musculoskeletal: Negative for arthralgias.   Skin: Negative for color change.   Neurological: Negative for dizziness and syncope.   Hematological: Negative for adenopathy.     Objective:     Vital Signs (Most Recent):  Temp: 98.2 °F (36.8 °C) (04/29/18 1207)  Pulse: 88 (04/29/18 1207)  Resp: 18 (04/29/18 1207)  BP: 125/69 (04/29/18 1207)  SpO2: 97 % (04/29/18 1207) Vital Signs (24h Range):  Temp:  [98.2 °F (36.8 °C)] 98.2 °F (36.8 °C)  Pulse:  [88] 88  Resp:  [18] 18  SpO2:  [97 %] 97 %  BP: (125)/(69) 125/69     Weight: 72.6 kg (160 lb)  Body mass index is 23.63 kg/m².    Physical Exam   Constitutional: He is oriented to person, place, and time. He appears well-developed and well-nourished.   HENT:   Head: Normocephalic and atraumatic.   Eyes: EOM are normal.   Cardiovascular: Normal rate and regular rhythm.    Pulmonary/Chest: Effort normal. No respiratory distress.   Abdominal: Soft. He exhibits distension (mild in upper abdomen). He exhibits no mass. There is no tenderness. There is no rebound and no guarding. No hernia.   Previous midline incision well healed   Musculoskeletal: Normal range of motion.   Neurological: He is alert and oriented to person, place, and time.   Skin: Skin is warm.   Psychiatric: He has a normal mood and affect. His behavior is normal.   Nursing note and vitals reviewed.      Significant Labs:  BMP (Last 3 Results): No results for input(s): GLU, NA, K, CL, CO2, BUN, CREATININE, CALCIUM, MG in the last 168 hours.  CBC (Last 3 Results):   Recent Labs  Lab 04/29/18  1234 04/29/18  1253   WBC 6.84  --    RBC 4.05*  --    HGB 13.3*  --    HCT 41.1 43     --    *  --    MCH 32.8*  --    MCHC 32.4  --      Cr 1.6 - stable from baseline     Significant Diagnostics:  None    Assessment/Plan:     ANA PAULAO  (small bowel obstruction)    Crampy abdominal pain, reflux/belching and inability to tolerate PO along with decreased bowel function likely recurrent partial SBO.     - CT abd pelvis with PO and IV contrast  - IVF resuscitation  - Pending CT findings - likely needs admission to CRS with conservative management of pSBO    The above was discussed with Dr. Butts.            Thank you for your consult. I will follow-up with patient. Please contact us if you have any additional questions.    Shawna Gray MD  Colorectal Surgery  [facility]

## 2018-04-29 NOTE — SUBJECTIVE & OBJECTIVE
(Not in a hospital admission)    Review of patient's allergies indicates:  No Known Allergies    Past Medical History:   Diagnosis Date    Atrial fibrillation     Cancer     colon    Inguinal hernia     ruptured inguinal hernia     Past Surgical History:   Procedure Laterality Date    COLON SURGERY      HERNIA REPAIR       Family History     Problem Relation (Age of Onset)    Heart disease Father    Stroke Mother        Social History Main Topics    Smoking status: Never Smoker    Smokeless tobacco: Never Used    Alcohol use Yes      Comment: social    Drug use: No    Sexual activity: No     Review of Systems   Constitutional: Positive for appetite change. Negative for chills, diaphoresis, fatigue and fever.   HENT: Negative for congestion.    Eyes: Negative for visual disturbance.   Respiratory: Negative for cough and shortness of breath.    Cardiovascular: Negative for chest pain.   Gastrointestinal: Positive for abdominal pain (crampy intermittent) and constipation. Negative for blood in stool, diarrhea, nausea, rectal pain and vomiting.        Belching, reflux   Genitourinary: Negative for dysuria.   Musculoskeletal: Negative for arthralgias.   Skin: Negative for color change.   Neurological: Negative for dizziness and syncope.   Hematological: Negative for adenopathy.     Objective:     Vital Signs (Most Recent):  Temp: 98.2 °F (36.8 °C) (04/29/18 1207)  Pulse: 88 (04/29/18 1207)  Resp: 18 (04/29/18 1207)  BP: 125/69 (04/29/18 1207)  SpO2: 97 % (04/29/18 1207) Vital Signs (24h Range):  Temp:  [98.2 °F (36.8 °C)] 98.2 °F (36.8 °C)  Pulse:  [88] 88  Resp:  [18] 18  SpO2:  [97 %] 97 %  BP: (125)/(69) 125/69     Weight: 72.6 kg (160 lb)  Body mass index is 23.63 kg/m².    Physical Exam   Constitutional: He is oriented to person, place, and time. He appears well-developed and well-nourished.   HENT:   Head: Normocephalic and atraumatic.   Eyes: EOM are normal.   Cardiovascular: Normal rate and regular  rhythm.    Pulmonary/Chest: Effort normal. No respiratory distress.   Abdominal: Soft. He exhibits distension (mild in upper abdomen). He exhibits no mass. There is no tenderness. There is no rebound and no guarding. No hernia.   Previous midline incision well healed   Musculoskeletal: Normal range of motion.   Neurological: He is alert and oriented to person, place, and time.   Skin: Skin is warm.   Psychiatric: He has a normal mood and affect. His behavior is normal.   Nursing note and vitals reviewed.      Significant Labs:  BMP (Last 3 Results): No results for input(s): GLU, NA, K, CL, CO2, BUN, CREATININE, CALCIUM, MG in the last 168 hours.  CBC (Last 3 Results):   Recent Labs  Lab 04/29/18  1234 04/29/18  1253   WBC 6.84  --    RBC 4.05*  --    HGB 13.3*  --    HCT 41.1 43     --    *  --    MCH 32.8*  --    MCHC 32.4  --      Cr 1.6 - stable from baseline     Significant Diagnostics:  None

## 2018-04-29 NOTE — HPI
89 yo M with history of stage II colon cancer s/p right hemicolectomy in 2007 (Dr. Schwartz) who was recently admitted for pSBO - managed conservatively (discharged 4/12/18) and incidental right hydronephrosis who presents with decreased PO intake. He did not require an NG tube his last admission.     He reports he has had stable crampy abdominal pain since discharge. His last normal BM was Friday. He has had increased belching and inability to tolerate PO since Friday. He has not had any nausea or emesis. No worsening abdominal pain.     On arrival to ED, he is hemodynamically stable. WBC is normal.

## 2018-04-29 NOTE — ED NOTES
Patient resting safe in room with wife at bedside. Pt. Aware of urine specimen needed. Pt. Reports he will notify staff when able to provide specimen. Call light within reach.

## 2018-04-29 NOTE — ED NOTES
"Patient to ED with c/o abdominal pain and belching starting last night. Patient alert and oriented x 4 with NAD noted at this time. Placed on telemetry monitors with VSS. Pt. Reports he was in hospital 2 weeks ago for small bowel obstruction that cleared with " gut resting and fluids." Pt. Reports he was able to eat breakfast this morning with no nausea or vomiting. MD at bedside at this time. Awaiting orders.  "

## 2018-04-29 NOTE — ED PROVIDER NOTES
Encounter Date: 4/29/2018    SCRIBE #1 NOTE: I, Bernadette Easton, am scribing for, and in the presence of,  Kamari Aragon MD. I have scribed the entire note.       History     Chief Complaint   Patient presents with    Abdominal Pain     dc'd 2 weeks ago with bowel obstruction     Mr. Sushant Cruz is an 89 y.o.  male with atrial fibrillation, colon CA s/p right hemicolectomy, recent partial SBO which resolved with conservative management, and history of incarcerated inguinal hernia s/p repair who presents to the ED complaining of episodic lower abdominal pain. Pain is described as mild but worsening, moderated by PO intake, and spontaneously resolving. It has been constant since his discharge approx 2 weeks prior. However, over the past few days, he reports increasing abdominal distention, frequent forceful burping, and malaise. He no longer has an appetite. Given persistence of symptoms for the past 2-3 days, they returned to ED. Pt feels these symptoms are consistent with previous SBO. Patient's last bm was 2 days PTA. Patient denies diarrhea, blood in stool, nausea, vomiting, and fever.       The history is provided by the patient and medical records.     Review of patient's allergies indicates:  No Known Allergies  Past Medical History:   Diagnosis Date    Atrial fibrillation     Cancer     colon    Inguinal hernia     ruptured inguinal hernia     Past Surgical History:   Procedure Laterality Date    COLON SURGERY      HERNIA REPAIR       Family History   Problem Relation Age of Onset    Stroke Mother     Heart disease Father      Social History   Substance Use Topics    Smoking status: Never Smoker    Smokeless tobacco: Never Used    Alcohol use Yes      Comment: social     Review of Systems   Constitutional: Positive for appetite change and fatigue. Negative for fever.   HENT: Negative for trouble swallowing.    Eyes: Negative for visual disturbance.   Respiratory: Negative for shortness of  breath.    Cardiovascular: Negative for chest pain.   Gastrointestinal: Positive for abdominal distention and abdominal pain. Negative for blood in stool, constipation, diarrhea, nausea and vomiting.   Genitourinary: Negative for dysuria.   Musculoskeletal: Negative for back pain.   Skin: Negative for rash.   Neurological: Negative for syncope.   Psychiatric/Behavioral: Negative for sleep disturbance.       Physical Exam     Initial Vitals [04/29/18 1207]   BP Pulse Resp Temp SpO2   125/69 88 18 98.2 °F (36.8 °C) 97 %      MAP       87.67         Physical Exam    Nursing note and vitals reviewed.  Constitutional: He appears well-developed and well-nourished. He is not diaphoretic. No distress.   HENT:   Head: Normocephalic and atraumatic.   Eyes: Conjunctivae and EOM are normal.   Neck: Normal range of motion. Neck supple. No JVD present.   Cardiovascular: Normal rate, regular rhythm, normal heart sounds and intact distal pulses. Exam reveals no gallop and no friction rub.    No murmur heard.  Pulmonary/Chest: Breath sounds normal. No respiratory distress. He has no wheezes. He has no rhonchi. He has no rales. He exhibits no tenderness.   Abdominal: Soft. Bowel sounds are normal. He exhibits no mass. There is no rebound and no guarding.   Abdomen is full. Generalized tenderness to deep palpation most significant in the RUQ. Hyperactive bowel sounds.   Musculoskeletal: Normal range of motion. He exhibits no tenderness.   Lymphadenopathy:     He has no cervical adenopathy.   Neurological: He is alert and oriented to person, place, and time. He has normal strength. No sensory deficit.   Skin: Skin is warm and dry. Capillary refill takes less than 2 seconds.   Psychiatric: He has a normal mood and affect.         ED Course   Procedures  Labs Reviewed   CBC W/ AUTO DIFFERENTIAL - Abnormal; Notable for the following:        Result Value    RBC 4.05 (*)     Hemoglobin 13.3 (*)      (*)     MCH 32.8 (*)     Immature  Granulocytes 0.6 (*)     Gran% 73.6 (*)     Lymph% 17.3 (*)     All other components within normal limits   COMPREHENSIVE METABOLIC PANEL - Abnormal; Notable for the following:     CO2 22 (*)     BUN, Bld 25 (*)     Creatinine 1.6 (*)     Calcium 11.4 (*)     Total Bilirubin 1.4 (*)     ALT 8 (*)     eGFR if  43.5 (*)     eGFR if non  37.6 (*)     All other components within normal limits   URINALYSIS, REFLEX TO URINE CULTURE - Abnormal; Notable for the following:     Ketones, UA Trace (*)     Occult Blood UA 1+ (*)     All other components within normal limits    Narrative:     Preferred Collection Type->Urine, Clean Catch   ISTAT PROCEDURE - Abnormal; Notable for the following:     POC Creatinine 1.6 (*)     All other components within normal limits   LIPASE   TROPONIN I   URINALYSIS MICROSCOPIC    Narrative:     Preferred Collection Type->Urine, Clean Catch   CEA     EKG Readings: (Independently Interpreted)   Rhythm: Atrial Fibrillation. Heart Rate: 76. ST Segments: Normal ST Segments. T Waves: Normal. Axis: Left Axis Deviation.     Imaging Results          CT Abdomen Pelvis With Contrast (Final result)  Result time 04/29/18 19:41:57    Final result by Simone Sutton MD (04/29/18 19:41:57)                 Impression:      1. Small-bowel obstruction similar to prior exam with transition point within the right lower quadrant.  2. Left inguinal hernia containing loop of colon, unchanged.  3. Scattered colonic diverticula without evidence of diverticulitis.  4. New small volume bilateral pleural effusions with associated mild adjacent compressive atelectasis  5. Grossly stable additional findings as above.    Electronically signed by resident: Chai Matthews  Date:    04/29/2018  Time:    18:44    Electronically signed by: Simone Sutton MD  Date:    04/29/2018  Time:    19:41             Narrative:    EXAMINATION:  CT ABDOMEN PELVIS WITH CONTRAST    CLINICAL HISTORY:  Abdominal pain,  unspecified;concern for SBO, previous right hemicolectomy;    TECHNIQUE:  Routine axial CT images of the abdomen were obtained after administration 75cc of IV Omnipaque 350.  Oral contrast was administered.  Coronal and Sagittal reformatted images were also obtained.    COMPARISON:  CT abdomen pelvis 04/10/2018    FINDINGS:  Heart: Upper limits of normal in Size.  No pericardial effusion.    Lungs: New small volume bilateral pleural effusions with associated mild adjacent compressive atelectasis.  No focal consolidation.    Liver: Normal in size and attenuation.  Grossly stable subcentimeter right hepatic lobe hypodensity too small to characterize however may represent a cyst.    Gallbladder: No calcified gallstones.    Bile ducts: No evidence of dilated ducts.    Pancreas: Age-related involution of the pancreas.    Spleen: Normal.    Adrenals: Normal.    GI Tract/ Mesentery: Moderate-sized hiatal hernia.  Stomach is markedly dilated.  Numerous dilated loops of small bowel measuring up to 3-1/2 cm with air-fluid levels consistent with a small-bowel obstruction.  Likely transition point within the right lower quadrant (series 2, image 50).  Postoperative changes from right hemicolectomy.  Scattered colonic diverticula without evidence of diverticulitis.    Kidneys/ Ureters: Atrophic kidneys with moderate right hydronephrosis similar to prior.  Punctate left nephrolithiasis.  No ureteral dilatation. Right kidney does not contract rate or excrete contrast.  Left kidney concentrates and excretes contrast normally.  Several fluid density lesions within the kidneys compatible with simple renal cyst.    Bladder: No evidence of wall thickening.    Reproductive organs: Prostatomegaly.    Retroperitoneum: No significant adenopathy.    Peritoneal space: Small volume ascites.  No free air.    Abdominal wall: Left inguinal hernia containing an uncomplicated loop of colon, similar to prior.    Vasculature: Moderate calcific  atherosclerosis of the tortuous ectatic abdominal aorta.    Bones: Degenerative changes of the lumbar spine.  0.7 cm of anterolisthesis of L5-S1.                                 Medical Decision Making:   History:   Old Medical Records: I decided to obtain old medical records.  Initial Assessment:   89 y.o male with history of colon CA s/p right hemicolectomy and recent hospitalization for partial SBO treated non surgically, and a-fib on Pradaxa presents with abdominal pain. This patient's differential diagnosis includes, but is not limited to: gastritis, gastroenteritis, enteritis, colitis, irritable bowel disease, inflammatory bowel disease, appendicitis, viral infection, diverticulitis, ileus, bowel obstruction, volvulus, ileus, hernia, intussusception, gas pains, indigestion, gastroesophageal reflux, aortic pathology, urinary tract infection, pyelonephritis, cystitis, gastric ulcer, duodenal ulcer, viscous perforation, mesenteric ischemia, Crohn's disease, AAA, aortic dissection, ulcerative colitis, renal stone, biliary colic, cholecystitis, pancreatitis, abdominal hernia, internal hernia. Will obtain labs and CT imaging and consult Colorectal Surgery.    Reassessment: Serum labs reveal no leukocytosis. CMP shows mild GENE with creatinine of 1.6. Troponin is negative. UA is negative for infection. Discussed with Colorectal; will await CT per plan.     Reassessment: CT with recurrent SBO with transition point. Pt will be admitted to colo-rectal surgery.  Independently Interpreted Test(s):   I have ordered and independently interpreted EKG Reading(s) - see prior notes  Clinical Tests:   Lab Tests: Ordered and Reviewed  Medical Tests: Ordered and Reviewed  Other:   I have discussed this case with another health care provider.            Scribe Attestation:   Scribe #1: I performed the above scribed service and the documentation accurately describes the services I performed. I attest to the accuracy of the  note.    Attending Attestation:           Physician Attestation for Scribe:      Comments: I, Dr. Kamari Aragon, personally performed the services described in this documentation. All medical record entries made by the scribe were at my direction and in my presence.  I have reviewed the chart and agree that the record reflects my personal performance and is accurate and complete. Kamari Aragon MD.  7:24 PM 04/29/2018                 Clinical Impression:   The primary encounter diagnosis was Abdominal pain, unspecified abdominal location. Diagnoses of GENE (acute kidney injury) and Small bowel obstruction were also pertinent to this visit.                           Kamari Aragon MD  04/29/18 2005

## 2018-04-29 NOTE — ED NOTES
Patient resting safe in room with family at bedside. Pt. Updated on plan of care. Denies any current needs. Call light within reach. Awaiting CT scan.

## 2018-04-29 NOTE — ED NOTES
Patient to CT scan and back to room safely. Placed back on telemetry monitors with audible alarms. Denies any current needs. Call light within reach. Awaiting test results.

## 2018-04-30 LAB
ANION GAP SERPL CALC-SCNC: 8 MMOL/L
BASOPHILS # BLD AUTO: 0.06 K/UL
BASOPHILS NFR BLD: 0.7 %
BUN SERPL-MCNC: 28 MG/DL
CALCIUM SERPL-MCNC: 10.5 MG/DL
CHLORIDE SERPL-SCNC: 104 MMOL/L
CO2 SERPL-SCNC: 21 MMOL/L
CREAT SERPL-MCNC: 1.3 MG/DL
DIFFERENTIAL METHOD: ABNORMAL
EOSINOPHIL # BLD AUTO: 0 K/UL
EOSINOPHIL NFR BLD: 0.4 %
ERYTHROCYTE [DISTWIDTH] IN BLOOD BY AUTOMATED COUNT: 13.2 %
EST. GFR  (AFRICAN AMERICAN): 55.9 ML/MIN/1.73 M^2
EST. GFR  (NON AFRICAN AMERICAN): 48.4 ML/MIN/1.73 M^2
GLUCOSE SERPL-MCNC: 120 MG/DL
HCT VFR BLD AUTO: 42.4 %
HGB BLD-MCNC: 13.3 G/DL
IMM GRANULOCYTES # BLD AUTO: 0.04 K/UL
IMM GRANULOCYTES NFR BLD AUTO: 0.5 %
LYMPHOCYTES # BLD AUTO: 1.4 K/UL
LYMPHOCYTES NFR BLD: 17.1 %
MAGNESIUM SERPL-MCNC: 1.6 MG/DL
MCH RBC QN AUTO: 33.4 PG
MCHC RBC AUTO-ENTMCNC: 31.4 G/DL
MCV RBC AUTO: 107 FL
MONOCYTES # BLD AUTO: 0.9 K/UL
MONOCYTES NFR BLD: 10.5 %
NEUTROPHILS # BLD AUTO: 5.7 K/UL
NEUTROPHILS NFR BLD: 70.8 %
NRBC BLD-RTO: 0 /100 WBC
PHOSPHATE SERPL-MCNC: 2.8 MG/DL
PLATELET # BLD AUTO: 272 K/UL
PMV BLD AUTO: 10.4 FL
POCT GLUCOSE: 106 MG/DL (ref 70–110)
POTASSIUM SERPL-SCNC: 4.3 MMOL/L
PREALB SERPL-MCNC: 15 MG/DL
RBC # BLD AUTO: 3.98 M/UL
SODIUM SERPL-SCNC: 133 MMOL/L
WBC # BLD AUTO: 8.07 K/UL

## 2018-04-30 PROCEDURE — 85025 COMPLETE CBC W/AUTO DIFF WBC: CPT

## 2018-04-30 PROCEDURE — 25000003 PHARM REV CODE 250: Performed by: NURSE PRACTITIONER

## 2018-04-30 PROCEDURE — 80048 BASIC METABOLIC PNL TOTAL CA: CPT

## 2018-04-30 PROCEDURE — 83735 ASSAY OF MAGNESIUM: CPT

## 2018-04-30 PROCEDURE — 84100 ASSAY OF PHOSPHORUS: CPT

## 2018-04-30 PROCEDURE — 84134 ASSAY OF PREALBUMIN: CPT

## 2018-04-30 PROCEDURE — 99222 1ST HOSP IP/OBS MODERATE 55: CPT | Mod: ,,, | Performed by: COLON & RECTAL SURGERY

## 2018-04-30 PROCEDURE — 20600001 HC STEP DOWN PRIVATE ROOM

## 2018-04-30 PROCEDURE — 36415 COLL VENOUS BLD VENIPUNCTURE: CPT

## 2018-04-30 RX ORDER — SODIUM CHLORIDE 9 MG/ML
INJECTION, SOLUTION INTRAVENOUS CONTINUOUS
Status: DISCONTINUED | OUTPATIENT
Start: 2018-04-30 | End: 2018-05-04 | Stop reason: HOSPADM

## 2018-04-30 RX ADMIN — SODIUM CHLORIDE: 0.9 INJECTION, SOLUTION INTRAVENOUS at 08:04

## 2018-04-30 NOTE — PLAN OF CARE
CM completed discharge assessment and planning with patient/ daughter, Bill. Patient verbalized understanding. Patient currently lives alone. Patient's daughter reports she lives a few blocks away from patient and visits patient often. Bill will provide patient with transportation home. CM and SW will continue to follow for any additional needs.    PCP: Bentley Lyon MD    Pharmacy:   [facility] [id number] [place] [address] - [address] AT [facility] [address]  [address]  [facility] [address]  Phone: [phone] Fax: [phone]      Payor: MEDICARE / Plan: MEDICARE PART A & B / Product Type: [facility] /      04/30/18 0955   Discharge Assessment   Assessment Type Discharge Planning Assessment   Confirmed/corrected address and phone number on facesheet? Yes   Assessment information obtained from? Patient   Communicated expected length of stay with patient/caregiver yes   Prior to hospitilization cognitive status: Alert/Oriented   Prior to hospitalization functional status: Independent   Current cognitive status: Alert/Oriented   Current Functional Status: Independent   Lives With alone   Able to Return to Prior Arrangements yes   Is patient able to care for self after discharge? Yes   Who are your caregiver(s) and their phone number(s)? self care  (daughter, Bill Estevez )   Readmission Within The Last 30 Days other (see comments)   Patient currently being followed by outpatient case management? No   Patient currently receives any other outside agency services? No   Is it the patient/care giver preference to resume care with the current outside agency? No   Equipment Currently Used at Home none   Do you have any problems affording any of your prescribed medications? TBD   Is the patient taking medications as prescribed? yes   Does the patient have transportation home? Yes   Dialysis Name and Scheduled days N/A   Does the patient receive services at the Coumadin  Clinic? No   Discharge Plan A Home;Home with family   Patient/Family In Agreement With Plan yes

## 2018-04-30 NOTE — ED NOTES
NG tube placed per MD orders.  Chloraseptic spray used prior to placement. Tube placement verified with jose roberto syringe by RUPALI Paz. Tube secured with nasal securement device at 65. Tube placed on low suction. Pt. Tolerating well.

## 2018-04-30 NOTE — PROGRESS NOTES
[facility]  Colorectal Surgery  Progress Note    Patient Name: Sushant Cruz  MRN: [id number]  Admission Date: 4/29/2018  Hospital Length of Stay: 1 days  Attending Physician: Robin Schwartz MD    Subjective:     Interval History: NG tube placed over night. Dark brown output ~400mL. Still belching. Passing flatus, no BM.     Post-Op Info:  * No surgery found *          Medications:  Continuous Infusions:   sodium chloride 0.9% 75 mL/hr at 04/30/18 0842     Scheduled Meds:  PRN Meds:   acetaminophen    HYDROmorphone    ondansetron    sodium chloride 0.9%        Objective:     Vital Signs (Most Recent):  Temp: 98.4 °F (36.9 °C) (04/30/18 0725)  Pulse: 79 (04/30/18 0725)  Resp: 16 (04/30/18 0725)  BP: (!) 147/84 (04/30/18 0725)  SpO2: 95 % (04/30/18 0725) Vital Signs (24h Range):  Temp:  [97 °F (36.1 °C)-98.5 °F (36.9 °C)] 98.4 °F (36.9 °C)  Pulse:  [52-91] 79  Resp:  [16-18] 16  SpO2:  [94 %-98 %] 95 %  BP: (125-189)/(64-96) 147/84     Intake/Output - Last 3 Shifts       04/28 0700 - 04/29 0659 04/29 0700 - 04/30 0659 04/30 0700 - 05/01 0659    P.O.   0    I.V. (mL/kg)  700 (9.6) 222.5 (3.1)    IV Piggyback  1000     Total Intake(mL/kg)  1700 (23.4) 222.5 (3.1)    Drains  150     Total Output   150      Net   +1550 +222.5           Urine Occurrence  2 x 1 x    Stool Occurrence  0 x     Emesis Occurrence  0 x           Physical Exam   Constitutional: He is oriented to person, place, and time. He appears well-developed and well-nourished.   HENT:   Head: Normocephalic and atraumatic.   Eyes: EOM are normal.   Cardiovascular: Normal rate and regular rhythm.    Pulmonary/Chest: Effort normal. No respiratory distress.   Abdominal: Soft. He exhibits distension (mild in upper abdomen). He exhibits no mass. There is no tenderness. There is no rebound and no guarding. No hernia.   NG with dark brown bilious output  Previous midline incision well healed   Musculoskeletal: Normal range of motion.    Neurological: He is alert and oriented to person, place, and time.   Skin: Skin is warm.   Psychiatric: He has a normal mood and affect. His behavior is normal.   Nursing note and vitals reviewed.      Significant Labs:  BMP (Last 3 Results):   Recent Labs  Lab 04/29/18  1234 04/30/18  0533    120*    133*   K 4.9 4.3    104   CO2 22* 21*   BUN 25* 28*   CREATININE 1.6* 1.3   CALCIUM 11.4* 10.5   MG  --  1.6     CBC (Last 3 Results):   Recent Labs  Lab 04/29/18  1234 04/29/18  1253 04/30/18  0533   WBC 6.84  --  8.07   RBC 4.05*  --  3.98*   HGB 13.3*  --  13.3*   HCT 41.1 43 42.4     --  272   *  --  107*   MCH 32.8*  --  33.4*   MCHC 32.4  --  31.4*       Significant Diagnostics:  CT: I have reviewed all pertinent results/findings within the past 24 hours:  concerning for recurrence in retroperitoneum with hydroureter and duodenal narrowing at 2nd portion    Assessment/Plan:     * SBO (small bowel obstruction)    Crampy abdominal pain, reflux/belching and inability to tolerate PO along with decreased bowel function likely recurrent partial SBO.     - Will review CT with radiology  - Continue NG tube, NPO, IVF  - Obtain PET scan today. CEA was 2.8  - May need surgical intervention for SBO - if he does have a local recurrence, this would be difficult to resect surgically and may be unresectable                Shawna Gray MD  Colorectal Surgery  [facility]

## 2018-04-30 NOTE — PLAN OF CARE
Problem: Patient Care Overview  Goal: Plan of Care Review  Outcome: Ongoing (interventions implemented as appropriate)  POC reviewed with pt. Who verbalized understanding. AAOx 4. Remains free of falls and injuries. VSS. NPO, denies nausea. Denies pain. Assist with activity. NG-LIWS. No acute events. No distress noted. WCTM.

## 2018-04-30 NOTE — CONSULTS
[facility]  Hematology/Oncology  Consult Note    Patient Name: Sushant Cruz  MRN: [id number]  Admission Date: 4/29/2018  Hospital Length of Stay: 1 days  Code Status: Full Code   Attending Provider: Robin Schwartz MD  Consulting Provider: Sergio Cortes MD  Primary Care Physician: Bentley Lyon MD  Principal Problem:SBO (small bowel obstruction)    Inpatient consult to Hematology/Oncology  Consult performed by: SERGIO CORTES  Consult ordered by: GINNY MATHEWS        Subjective:     HPI: Mr. Cruz is an 88 y/o M PMHx of Atrial Fibrillation, Stage II Colon Cancer s/p right hemicolectomy in 07/2017, recent partial SBO which resolved with conservative management, and history of incarcerated inguinal hernia s/p repair who presents to the ED complaining of episodic lower abdominal pain. Repeat imaging concerning for moderate right hydronephrosis and small-bowel obstruction similar to prior exam with transition point within the right lower quadrant. He had a PET/CT Scan done today which is suggestive of peritoneal carcinomatosis and a single liver metastasis. There may be obstruction of the distal left ureter from peritoneal implants.    Medications:  Continuous Infusions:   sodium chloride 0.9% 75 mL/hr at 04/30/18 0842     Scheduled Meds:  PRN Meds:acetaminophen, HYDROmorphone, ondansetron, sodium chloride 0.9%     Review of patient's allergies indicates:  No Known Allergies     Past Medical History:   Diagnosis Date    Atrial fibrillation     Cancer     colon    Inguinal hernia     ruptured inguinal hernia     Past Surgical History:   Procedure Laterality Date    COLON SURGERY      HERNIA REPAIR       Family History     Problem Relation (Age of Onset)    Heart disease Father    Stroke Mother        Social History Main Topics    Smoking status: Never Smoker    Smokeless tobacco: Never Used    Alcohol use Yes      Comment: social    Drug use: No    Sexual activity: No       Review of  Systems   Constitutional: Positive for activity change, appetite change and fatigue. Negative for fever.   Respiratory: Negative for cough and shortness of breath.    Cardiovascular: Negative for chest pain.   Gastrointestinal: Positive for abdominal distention, abdominal pain and nausea.   Genitourinary: Negative for dysuria.   Neurological: Positive for weakness.     Objective:     Vital Signs (Most Recent):  Temp: 98.4 °F (36.9 °C) (04/30/18 0725)  Pulse: 79 (04/30/18 0725)  Resp: 16 (04/30/18 0725)  BP: (!) 147/84 (04/30/18 0725)  SpO2: 95 % (04/30/18 0725) Vital Signs (24h Range):  Temp:  [97 °F (36.1 °C)-98.5 °F (36.9 °C)] 98.4 °F (36.9 °C)  Pulse:  [52-91] 79  Resp:  [16-18] 16  SpO2:  [94 %-97 %] 95 %  BP: (147-189)/(75-91) 147/84     Weight: 72.6 kg (160 lb)  Body mass index is 23.63 kg/m².  Body surface area is 1.88 meters squared.      Intake/Output Summary (Last 24 hours) at 04/30/18 1611  Last data filed at 04/30/18 1400   Gross per 24 hour   Intake           1297.5 ml   Output              150 ml   Net           1147.5 ml       Physical Exam  Constitutional: He is oriented to person, place, and time. He appears well-developed and well-nourished.   HENT:   Head: Normocephalic and atraumatic.   Eyes: EOM are normal.   Cardiovascular: Normal rate and regular rhythm.    Pulmonary/Chest: Effort normal. No respiratory distress.   Abdominal: Soft. He exhibits distension. He exhibits no mass. There is no tenderness. There is no rebound and no guarding. No hernia.   NG with dark brown bilious output  Musculoskeletal: Normal range of motion.   Neurological: He is alert and oriented to person, place, and time.   Skin: Skin is warm.   Psychiatric: He has a normal mood and affect. His behavior is normal.     Significant Labs:   CBC:   Recent Labs  Lab 04/29/18  1234 04/29/18  1253 04/30/18  0533   WBC 6.84  --  8.07   HGB 13.3*  --  13.3*   HCT 41.1 43 42.4     --  272    and CMP:   Recent Labs  Lab  04/29/18  1234 04/30/18  0533    133*   K 4.9 4.3    104   CO2 22* 21*    120*   BUN 25* 28*   CREATININE 1.6* 1.3   CALCIUM 11.4* 10.5   PROT 8.3  --    ALBUMIN 3.8  --    BILITOT 1.4*  --    ALKPHOS 82  --    AST 15  --    ALT 8*  --    ANIONGAP 10 8   EGFRNONAA 37.6* 48.4*       Diagnostic Results:  I have reviewed all pertinent imaging results/findings within the past 24 hours.      PET/CT Scan:  There is a single liver metastasis involving the superior most dome of the liver SUV max 6.75.  There is peritoneal carcinomatosis of the abdomen and pelvis upper abdomen mesentery index lesion anteriorly SUV max 4.11.  Three dominant left pelvic implants SUV max average 5.66.  There is a small hypo functioning right kidney.  There is partial obstruction of the distal left ureter probably from peritoneal implants.    There are pleural effusions.  There is a pattern suggesting small bowel dilatation.  This could be obstruction.  NG tube is coiled back up into the esophagus.  There are posterior basal areas of atelectasis.     FINAL PATHOLOGIC DIAGNOSIS 7/03/17:  Right colon:  Well-differentiated adenocarcinoma, 3.0 cm, pathologic staging [id number], with negative surgical resection  margins; see synoptic report.  Appendix with distal carcinoid tumorlet (0.5 mm);  Zero (0) of nineteen (19) lymph nodes containing carcinoma.    Colon Cancer Synoptic Report  Specimen: Ascending colon  Procedure: Right hemicolectomy  Tumor site: Right colon  Tumor size: 3.0 cm in greatest dimension  Macroscopic tumor perforation: Not identified  Histologic type: Adenocarcinoma  Histologic grade: Low-grade (well to moderately differentiated)  Microscopic tumor extension: Invasion through muscularis propria into the subserosal adipose tissue, not extending  to visceral peritoneum  Margins:  Distance from closest margin: 5 cm (distal)  Proximal margin: Uninvolved by invasive carcinoma (negative for dysplasia)  Distal margin:  Uninvolved by invasive carcinoma (negative for dysplasia)  Circumferential margin: Uninvolved by invasive carcinoma  Treatment effect: No prior treatment  Lymph-vascular invasion: Present  Perineural invasion: Not identified  Tumor deposits: Not identified  Staging:  Primary tumor (pT): pT3: Tumor invades through muscularis propria into pericolonic adipose tissue  Regional lymph nodes: pN0: No regional lymph node metastasis  Number of nodes examined: 19  Number of nodes involved: 0  Distant metastasis: Not applicable    Assessment/Plan:     Active Diagnoses:    Diagnosis Date Noted POA    PRINCIPAL PROBLEM:  SBO (small bowel obstruction) [K56.609] 04/10/2018 Yes    Abdominal pain [R10.9] 04/29/2018 Yes    Malignant neoplasm of ascending colon [C18.2] 06/28/2017 Yes      Problems Resolved During this Admission:    Diagnosis Date Noted Date Resolved POA       Peritoneal Carcinomatosis  Hx of Stage II Colon Cancer   SBO  ?Ureteral Obstruction/Hydronephrosis  - patient was initially diagnosed with Stage II Colon Cancer in July, 2017 s/p right hemicolectomy now with PET/CT Scan findings suggestive of peritoneal carcinomatosis leading to partial SBO. CEA only 2.8 ng/mL.  - recommend obtaining biopsy of the peritoneal implant/liver if feasible to prove recurrent colon cancer vs new primary, given he only had an early stage well differentiated cancer last year.   - will ask pathology to determine tumor gene status for PEE and BRAF on prior specimen if possible.  - given he had relatively good performance status prior to his recent admissions and no significant other comorbidites, perhaps he would be a good candidate for palliative chemotherapy. Hoping he can be managed with only a venting G-Tube and can be seen in clinic soon afterward for evaluation.   - evaluation ongoing per CRS for potential venting G-Tube in the near future vs surgical resection.   - will arrange outpatient Medical Oncology Clinic follow up upon  discharge.     Case discussed with Dr. Lucinda Dumont.     Thank you for your consult. I will follow-up with patient. Please contact us if you have any additional questions.    Pete Cortes MD  Hematology/Oncology  [facility]    STAFF NOTE:  I have personally taken the history and examined this patient and agree with Dr. Cortes's Note as stated above.  Also discussed with Dr. Schwartz for a repeat biopsy of either liver lesion or peritoneum to evaluate for a new primary

## 2018-04-30 NOTE — ASSESSMENT & PLAN NOTE
Crampy abdominal pain, reflux/belching and inability to tolerate PO along with decreased bowel function likely recurrent partial SBO.     - Will review CT with radiology  - Continue NG tube, NPO, IVF  - Obtain PET scan today. CEA was 2.8  - May need surgical intervention for SBO - if he does have a local recurrence, this would be difficult to resect surgically and may be unresectable

## 2018-04-30 NOTE — ED NOTES
Pt. Remains safe on unit with family at bedside. MD has been to bedside, updated on plan of care. Denies any current needs, call light within reach.

## 2018-04-30 NOTE — PLAN OF CARE
Problem: Patient Care Overview  Goal: Plan of Care Review  Outcome: Ongoing (interventions implemented as appropriate)  Plan of care reviewed with the pt, pt verbalized understanding.  Pt is AAOx4, VSS.  NGT to LIWS.  Pt understands being NPO, swabs provided for comfort.  Pt up with assist, free of falls and injuries.  Pt denies pain, chest pain, SOB, or nausea.  Bed in low position, call light in reach, nonskid socks on, WCTM

## 2018-04-30 NOTE — SUBJECTIVE & OBJECTIVE
Subjective:     Interval History: NG tube placed over night. Dark brown output ~400mL. Still belching. Passing flatus, no BM.     Post-Op Info:  * No surgery found *          Medications:  Continuous Infusions:   sodium chloride 0.9% 75 mL/hr at 04/30/18 0842     Scheduled Meds:  PRN Meds:   acetaminophen    HYDROmorphone    ondansetron    sodium chloride 0.9%        Objective:     Vital Signs (Most Recent):  Temp: 98.4 °F (36.9 °C) (04/30/18 0725)  Pulse: 79 (04/30/18 0725)  Resp: 16 (04/30/18 0725)  BP: (!) 147/84 (04/30/18 0725)  SpO2: 95 % (04/30/18 0725) Vital Signs (24h Range):  Temp:  [97 °F (36.1 °C)-98.5 °F (36.9 °C)] 98.4 °F (36.9 °C)  Pulse:  [52-91] 79  Resp:  [16-18] 16  SpO2:  [94 %-98 %] 95 %  BP: (125-189)/(64-96) 147/84     Intake/Output - Last 3 Shifts       04/28 0700 - 04/29 0659 04/29 0700 - 04/30 0659 04/30 0700 - 05/01 0659    P.O.   0    I.V. (mL/kg)  700 (9.6) 222.5 (3.1)    IV Piggyback  1000     Total Intake(mL/kg)  1700 (23.4) 222.5 (3.1)    Drains  150     Total Output   150      Net   +1550 +222.5           Urine Occurrence  2 x 1 x    Stool Occurrence  0 x     Emesis Occurrence  0 x           Physical Exam   Constitutional: He is oriented to person, place, and time. He appears well-developed and well-nourished.   HENT:   Head: Normocephalic and atraumatic.   Eyes: EOM are normal.   Cardiovascular: Normal rate and regular rhythm.    Pulmonary/Chest: Effort normal. No respiratory distress.   Abdominal: Soft. He exhibits distension (mild in upper abdomen). He exhibits no mass. There is no tenderness. There is no rebound and no guarding. No hernia.   NG with dark brown bilious output  Previous midline incision well healed   Musculoskeletal: Normal range of motion.   Neurological: He is alert and oriented to person, place, and time.   Skin: Skin is warm.   Psychiatric: He has a normal mood and affect. His behavior is normal.   Nursing note and vitals reviewed.      Significant  Labs:  BMP (Last 3 Results):   Recent Labs  Lab 04/29/18  1234 04/30/18  0533    120*    133*   K 4.9 4.3    104   CO2 22* 21*   BUN 25* 28*   CREATININE 1.6* 1.3   CALCIUM 11.4* 10.5   MG  --  1.6     CBC (Last 3 Results):   Recent Labs  Lab 04/29/18  1234 04/29/18  1253 04/30/18  0533   WBC 6.84  --  8.07   RBC 4.05*  --  3.98*   HGB 13.3*  --  13.3*   HCT 41.1 43 42.4     --  272   *  --  107*   MCH 32.8*  --  33.4*   MCHC 32.4  --  31.4*       Significant Diagnostics:  CT: I have reviewed all pertinent results/findings within the past 24 hours:  concerning for recurrence in retroperitoneum with hydroureter and duodenal narrowing at 2nd portion

## 2018-05-01 PROBLEM — C78.7 SECONDARY LIVER CANCER: Status: ACTIVE | Noted: 2018-05-01

## 2018-05-01 PROBLEM — K56.609 SMALL BOWEL OBSTRUCTION: Status: ACTIVE | Noted: 2018-05-01

## 2018-05-01 PROBLEM — C78.6 SECONDARY MALIGNANT PERITONEAL DEPOSIT: Status: ACTIVE | Noted: 2018-05-01

## 2018-05-01 LAB
ANION GAP SERPL CALC-SCNC: 6 MMOL/L
BASOPHILS # BLD AUTO: 0.07 K/UL
BASOPHILS NFR BLD: 0.9 %
BUN SERPL-MCNC: 28 MG/DL
CALCIUM SERPL-MCNC: 10.2 MG/DL
CHLORIDE SERPL-SCNC: 109 MMOL/L
CO2 SERPL-SCNC: 20 MMOL/L
CREAT SERPL-MCNC: 1.3 MG/DL
DIFFERENTIAL METHOD: ABNORMAL
EOSINOPHIL # BLD AUTO: 0.1 K/UL
EOSINOPHIL NFR BLD: 1.5 %
ERYTHROCYTE [DISTWIDTH] IN BLOOD BY AUTOMATED COUNT: 13.3 %
EST. GFR  (AFRICAN AMERICAN): 55.9 ML/MIN/1.73 M^2
EST. GFR  (NON AFRICAN AMERICAN): 48.4 ML/MIN/1.73 M^2
GLUCOSE SERPL-MCNC: 83 MG/DL
HCT VFR BLD AUTO: 34.3 %
HGB BLD-MCNC: 11.4 G/DL
IMM GRANULOCYTES # BLD AUTO: 0.02 K/UL
IMM GRANULOCYTES NFR BLD AUTO: 0.2 %
LYMPHOCYTES # BLD AUTO: 1.3 K/UL
LYMPHOCYTES NFR BLD: 16.6 %
MAGNESIUM SERPL-MCNC: 1.6 MG/DL
MCH RBC QN AUTO: 33.8 PG
MCHC RBC AUTO-ENTMCNC: 33.2 G/DL
MCV RBC AUTO: 102 FL
MONOCYTES # BLD AUTO: 0.8 K/UL
MONOCYTES NFR BLD: 9.7 %
NEUTROPHILS # BLD AUTO: 5.7 K/UL
NEUTROPHILS NFR BLD: 71.1 %
NRBC BLD-RTO: 0 /100 WBC
PHOSPHATE SERPL-MCNC: 2.7 MG/DL
PLATELET # BLD AUTO: 267 K/UL
PMV BLD AUTO: 10.6 FL
POTASSIUM SERPL-SCNC: 4.2 MMOL/L
RBC # BLD AUTO: 3.37 M/UL
SODIUM SERPL-SCNC: 135 MMOL/L
WBC # BLD AUTO: 8.06 K/UL

## 2018-05-01 PROCEDURE — 99233 SBSQ HOSP IP/OBS HIGH 50: CPT | Mod: GC,,, | Performed by: INTERNAL MEDICINE

## 2018-05-01 PROCEDURE — 36415 COLL VENOUS BLD VENIPUNCTURE: CPT

## 2018-05-01 PROCEDURE — 83735 ASSAY OF MAGNESIUM: CPT

## 2018-05-01 PROCEDURE — 25000003 PHARM REV CODE 250: Performed by: NURSE PRACTITIONER

## 2018-05-01 PROCEDURE — C1751 CATH, INF, PER/CENT/MIDLINE: HCPCS

## 2018-05-01 PROCEDURE — 02HV33Z INSERTION OF INFUSION DEVICE INTO SUPERIOR VENA CAVA, PERCUTANEOUS APPROACH: ICD-10-PCS | Performed by: COLON & RECTAL SURGERY

## 2018-05-01 PROCEDURE — 36569 INSJ PICC 5 YR+ W/O IMAGING: CPT

## 2018-05-01 PROCEDURE — 76937 US GUIDE VASCULAR ACCESS: CPT

## 2018-05-01 PROCEDURE — 99232 SBSQ HOSP IP/OBS MODERATE 35: CPT | Mod: ,,, | Performed by: COLON & RECTAL SURGERY

## 2018-05-01 PROCEDURE — 85025 COMPLETE CBC W/AUTO DIFF WBC: CPT

## 2018-05-01 PROCEDURE — 20600001 HC STEP DOWN PRIVATE ROOM

## 2018-05-01 PROCEDURE — 84100 ASSAY OF PHOSPHORUS: CPT

## 2018-05-01 PROCEDURE — 80048 BASIC METABOLIC PNL TOTAL CA: CPT

## 2018-05-01 RX ADMIN — SODIUM CHLORIDE: 0.9 INJECTION, SOLUTION INTRAVENOUS at 04:05

## 2018-05-01 RX ADMIN — SODIUM CHLORIDE: 0.9 INJECTION, SOLUTION INTRAVENOUS at 02:05

## 2018-05-01 NOTE — CONSULTS
Double lumen PICC placed to (R) BASILICvein.   34cm in length, 0cm exposed, and 28cm arm circumference.  Lot # [id number]

## 2018-05-01 NOTE — PLAN OF CARE
Problem: Patient Care Overview  Goal: Plan of Care Review  POC reviewed with patient and daughter who both verbalized understanding. AAOx4. VSS on RA. Skin free of any breakdown. Left nare NGT in place to LIWS with small amount of brown output overnight. Denies any pain or nausea. IVF infusing, up with standby assist to bathroom to urinate, passing gas, had a small BM. Liver biopsy to be done as outpatient. Safety precautions maintained, call light within reach. Refusing BLE SCDS. Remains free from falls and injury. Pt ambulated in hallway with daugter

## 2018-05-01 NOTE — H&P
Patient Name: Sushant Cruz  MRN: [id number]  Admission Date: 4/29/2018  Hospital Length of Stay: 0 days  Attending Physician: Kamari Aragon MD  Primary Care Provider: Bentley Lyon MD     Consults  Subjective:      Chief Complaint/Reason for Admission: Anorexia, abdominal pain, constipation     History of Present Illness:  88 yo M with history of stage II colon cancer s/p right hemicolectomy in 2007 (Dr. Schwartz) who was recently admitted for pSBO - managed conservatively (discharged 4/12/18) and incidental right hydronephrosis who presents with decreased PO intake. He did not require an NG tube his last admission.      He reports he has had stable crampy abdominal pain since discharge. His last normal BM was Friday. He has had increased belching and inability to tolerate PO since Friday. He has not had any nausea or emesis. No worsening abdominal pain.      On arrival to ED, he is hemodynamically stable. WBC is normal.         (Not in a hospital admission)     Review of patient's allergies indicates:  No Known Allergies          Past Medical History:   Diagnosis Date    Atrial fibrillation      Cancer       colon    Inguinal hernia       ruptured inguinal hernia            Past Surgical History:   Procedure Laterality Date    COLON SURGERY        HERNIA REPAIR               Family History      Problem Relation (Age of Onset)     Heart disease Father     Stroke Mother                 Social History Main Topics    Smoking status: Never Smoker    Smokeless tobacco: Never Used    Alcohol use Yes         Comment: social    Drug use: No    Sexual activity: No      Review of Systems   Constitutional: Positive for appetite change. Negative for chills, diaphoresis, fatigue and fever.   HENT: Negative for congestion.    Eyes: Negative for visual disturbance.   Respiratory: Negative for cough and shortness of breath.    Cardiovascular: Negative for chest pain.   Gastrointestinal: Positive for abdominal pain (crampy  intermittent) and constipation. Negative for blood in stool, diarrhea, nausea, rectal pain and vomiting.        Belching, reflux   Genitourinary: Negative for dysuria.   Musculoskeletal: Negative for arthralgias.   Skin: Negative for color change.   Neurological: Negative for dizziness and syncope.   Hematological: Negative for adenopathy.      Objective:      Vital Signs (Most Recent):  Temp: 98.2 °F (36.8 °C) (04/29/18 1207)  Pulse: 88 (04/29/18 1207)  Resp: 18 (04/29/18 1207)  BP: 125/69 (04/29/18 1207)  SpO2: 97 % (04/29/18 1207) Vital Signs (24h Range):  Temp:  [98.2 °F (36.8 °C)] 98.2 °F (36.8 °C)  Pulse:  [88] 88  Resp:  [18] 18  SpO2:  [97 %] 97 %  BP: (125)/(69) 125/69      Weight: 72.6 kg (160 lb)  Body mass index is 23.63 kg/m².     Physical Exam   Constitutional: He is oriented to person, place, and time. He appears well-developed and well-nourished.   HENT:   Head: Normocephalic and atraumatic.   Eyes: EOM are normal.   Cardiovascular: Normal rate and regular rhythm.    Pulmonary/Chest: Effort normal. No respiratory distress.   Abdominal: Soft. He exhibits distension (mild in upper abdomen). He exhibits no mass. There is no tenderness. There is no rebound and no guarding. No hernia.   Previous midline incision well healed   Musculoskeletal: Normal range of motion.   Neurological: He is alert and oriented to person, place, and time.   Skin: Skin is warm.   Psychiatric: He has a normal mood and affect. His behavior is normal.   Nursing note and vitals reviewed.        Significant Labs:  BMP (Last 3 Results): No results for input(s): GLU, NA, K, CL, CO2, BUN, CREATININE, CALCIUM, MG in the last 168 hours.  CBC (Last 3 Results):   Recent Labs  Lab 04/29/18  1234 04/29/18  1253   WBC 6.84  --    RBC 4.05*  --    HGB 13.3*  --    HCT 41.1 43     --    *  --    MCH 32.8*  --    MCHC 32.4  --       Cr 1.6 - stable from baseline      Significant Diagnostics:  None     Assessment/Plan:          SBO  (small bowel obstruction)     Crampy abdominal pain, reflux/belching and inability to tolerate PO along with decreased bowel function likely recurrent partial SBO.      - CT abd pelvis with PO and IV contrast  - IVF resuscitation  - Pending CT findings - likely needs admission to CRS with conservative management of pSBO     The above was discussed with Dr. Butts.                 Shawna Gray MD  Colorectal Surgery  [facility]    Agree with Above.    Robin Oreilly MD

## 2018-05-01 NOTE — PROGRESS NOTES
[facility]  Colorectal Surgery  Progress Note    Patient Name: Sushant Cruz  MRN: [id number]  Admission Date: 4/29/2018  Hospital Length of Stay: 2 days  Attending Physician: Robin Schwartz MD    Subjective:     Interval History: No acute events overnight. Oncology came and discussed plans with patient and family overnight. NGT in place with dark brown output ~350mL. Still belching. Passing flatus. Last bowel movements Friday     Post-Op Info:  * No surgery found *          Medications:  Continuous Infusions:   sodium chloride 0.9% 75 mL/hr at 05/01/18 0221     Scheduled Meds:  PRN Meds:   acetaminophen    HYDROmorphone    ondansetron    sodium chloride 0.9%        Objective:     Vital Signs (Most Recent):  Temp: 98 °F (36.7 °C) (05/01/18 0810)  Pulse: 98 (05/01/18 0810)  Resp: 16 (05/01/18 0810)  BP: 131/85 (05/01/18 0810)  SpO2: 97 % (05/01/18 0810) Vital Signs (24h Range):  Temp:  [96.3 °F (35.7 °C)-98 °F (36.7 °C)] 98 °F (36.7 °C)  Pulse:  [71-98] 98  Resp:  [16-18] 16  SpO2:  [92 %-97 %] 97 %  BP: (131-149)/(72-85) 131/85     Intake/Output - Last 3 Shifts       04/29 0700 - 04/30 0659 04/30 0700 - 05/01 0659 05/01 0700 - 05/02 0659    P.O.  0     I.V. (mL/kg) 700 (9.6) 1666.3 (23)     IV Piggyback 1000      Total Intake(mL/kg) 1700 (23.4) 1666.3 (23)     Drains 150 350     Total Output 150 350      Net +1550 +1316.3             Urine Occurrence 2 x 6 x     Stool Occurrence 0 x 0 x     Emesis Occurrence 0 x 0 x           Physical Exam   Constitutional: He is oriented to person, place, and time. He appears well-developed and well-nourished.   HENT:   Head: Normocephalic and atraumatic.   Eyes: EOM are normal.   Cardiovascular: Normal rate and regular rhythm.    Pulmonary/Chest: Effort normal. No respiratory distress.   Abdominal: Soft. He exhibits distension (mild in upper abdomen). He exhibits no mass. There is no tenderness. There is no rebound and no guarding. No hernia.   NG with dark  brown bilious output  Previous midline incision well healed   Musculoskeletal: Normal range of motion.   Neurological: He is alert and oriented to person, place, and time.   Skin: Skin is warm.   Psychiatric: He has a normal mood and affect. His behavior is normal.   Nursing note and vitals reviewed.      Significant Labs:  BMP (Last 3 Results):     Recent Labs  Lab 04/29/18  1234 04/30/18  0533 05/01/18  0409    120* 83    133* 135*   K 4.9 4.3 4.2    104 109   CO2 22* 21* 20*   BUN 25* 28* 28*   CREATININE 1.6* 1.3 1.3   CALCIUM 11.4* 10.5 10.2   MG  --  1.6 1.6     CBC (Last 3 Results):     Recent Labs  Lab 04/29/18  1234 04/29/18  1253 04/30/18  0533 05/01/18  0409   WBC 6.84  --  8.07 8.06   RBC 4.05*  --  3.98* 3.37*   HGB 13.3*  --  13.3* 11.4*   HCT 41.1 43 42.4 34.3*     --  272 267   *  --  107* 102*   MCH 32.8*  --  33.4* 33.8*   MCHC 32.4  --  31.4* 33.2       Significant Diagnostics:  CT: I have reviewed all pertinent results/findings within the past 24 hours:  concerning for recurrence in retroperitoneum with hydroureter and duodenal narrowing at 2nd portion    Assessment/Plan:     * SBO (small bowel obstruction)    Crampy abdominal pain, reflux/belching and inability to tolerate PO along with decreased bowel function likely recurrent partial SBO.     - NPO   -mIVF  - Continue NG tube   -PRN pain medications   -daily labs   - Oncology following: recommend biopsy of the peritoneal implant/liver if feasible to prove recurrent colon cancer vs new primary. Will discuss with IR  - Will discuss placing Gtube with family and staff              Radha Truong MD  Colorectal Surgery  [facility]

## 2018-05-01 NOTE — PLAN OF CARE
Problem: Patient Care Overview  Goal: Plan of Care Review  Outcome: Ongoing (interventions implemented as appropriate)  POC reviewed with patient and daughter who both verbalized understanding. AAOx4. VSS on RA. Skin free of any breakdown. Left nare NGT in place to LIWS with small amount of brown output overnight. Denies any pain or nausea. IVF infusing, up with standby assist to bathroom to urinate, passing gas no BM. Safety precautions maintained, call light within reach. Refusing BLE SCDS. Remains free from falls and injury. No acute events. No distress noted. Will continue to monitor.

## 2018-05-01 NOTE — ASSESSMENT & PLAN NOTE
Crampy abdominal pain, reflux/belching and inability to tolerate PO along with decreased bowel function likely recurrent partial SBO.     - NPO   -mIVF  - Continue NG tube   -PRN pain medications   -daily labs   - Oncology following: recommend biopsy of the peritoneal implant/liver if feasible to prove recurrent colon cancer vs new primary. Will discuss with IR  - Will discuss placing Gtube with family and staff

## 2018-05-01 NOTE — PROCEDURES
"Sushant Cruz is a 89 y.o. male patient.    Temp: 98 °F (36.7 °C) (05/01/18 0810)  Pulse: 98 (05/01/18 0810)  Resp: 16 (05/01/18 0810)  BP: 131/85 (05/01/18 0810)  SpO2: 97 % (05/01/18 0810)  Weight: 72.6 kg (160 lb) (04/29/18 1207)  Height: 5' 9" (175.3 cm) (04/29/18 1207)    PICC  Date/Time: 5/1/2018 11:25 AM  Performed by: GIFTY BLANCA  Consent Done: Yes  Time out: Immediately prior to procedure a time out was called to verify the correct patient, procedure, equipment, support staff and site/side marked as required  Indications: med administration and vascular access  Anesthesia: local infiltration  Local anesthetic: lidocaine 1% without epinephrine  Anesthetic Total (mL): 2  Preparation: skin prepped with ChloraPrep  Skin prep agent dried: skin prep agent completely dried prior to procedure  Sterile barriers: all five maximum sterile barriers used - cap, mask, sterile gown, sterile gloves, and large sterile sheet  Hand hygiene: hand hygiene performed prior to central venous catheter insertion  Location details: right basilic  Catheter type: double lumen  Catheter size: 5 Fr  Catheter Length: 34cm    Ultrasound guidance: yes  Vessel Caliber: medium and patent, compressibility normal  Vascular Doppler: not done  Needle advanced into vessel with real time Ultrasound guidance.  Guidewire confirmed in vessel.  Image recorded and saved.  Sterile sheath used.  no esophageal manometryNumber of attempts: 1  Post-procedure: blood return through all ports, chlorhexidine patch and sterile dressing applied  Specimens: No  Implants: No  Assessment: placement verified by x-ray  Complications: none        Kenyatta Yang  5/1/2018    "

## 2018-05-01 NOTE — SUBJECTIVE & OBJECTIVE
Subjective:     Interval History: No acute events overnight. Oncology came and discussed plans with patient and family overnight. NGT in place with dark brown output ~350mL. Still belching. Passing flatus. Last bowel movements Friday     Post-Op Info:  * No surgery found *          Medications:  Continuous Infusions:   sodium chloride 0.9% 75 mL/hr at 05/01/18 0221     Scheduled Meds:  PRN Meds:   acetaminophen    HYDROmorphone    ondansetron    sodium chloride 0.9%        Objective:     Vital Signs (Most Recent):  Temp: 98 °F (36.7 °C) (05/01/18 0810)  Pulse: 98 (05/01/18 0810)  Resp: 16 (05/01/18 0810)  BP: 131/85 (05/01/18 0810)  SpO2: 97 % (05/01/18 0810) Vital Signs (24h Range):  Temp:  [96.3 °F (35.7 °C)-98 °F (36.7 °C)] 98 °F (36.7 °C)  Pulse:  [71-98] 98  Resp:  [16-18] 16  SpO2:  [92 %-97 %] 97 %  BP: (131-149)/(72-85) 131/85     Intake/Output - Last 3 Shifts       04/29 0700 - 04/30 0659 04/30 0700 - 05/01 0659 05/01 0700 - 05/02 0659    P.O.  0     I.V. (mL/kg) 700 (9.6) 1666.3 (23)     IV Piggyback 1000      Total Intake(mL/kg) 1700 (23.4) 1666.3 (23)     Drains 150 350     Total Output 150 350      Net +1550 +1316.3             Urine Occurrence 2 x 6 x     Stool Occurrence 0 x 0 x     Emesis Occurrence 0 x 0 x           Physical Exam   Constitutional: He is oriented to person, place, and time. He appears well-developed and well-nourished.   HENT:   Head: Normocephalic and atraumatic.   Eyes: EOM are normal.   Cardiovascular: Normal rate and regular rhythm.    Pulmonary/Chest: Effort normal. No respiratory distress.   Abdominal: Soft. He exhibits distension (mild in upper abdomen). He exhibits no mass. There is no tenderness. There is no rebound and no guarding. No hernia.   NG with dark brown bilious output  Previous midline incision well healed   Musculoskeletal: Normal range of motion.   Neurological: He is alert and oriented to person, place, and time.   Skin: Skin is warm.   Psychiatric: He  has a normal mood and affect. His behavior is normal.   Nursing note and vitals reviewed.      Significant Labs:  BMP (Last 3 Results):     Recent Labs  Lab 04/29/18  1234 04/30/18  0533 05/01/18  0409    120* 83    133* 135*   K 4.9 4.3 4.2    104 109   CO2 22* 21* 20*   BUN 25* 28* 28*   CREATININE 1.6* 1.3 1.3   CALCIUM 11.4* 10.5 10.2   MG  --  1.6 1.6     CBC (Last 3 Results):     Recent Labs  Lab 04/29/18  1234 04/29/18  1253 04/30/18  0533 05/01/18  0409   WBC 6.84  --  8.07 8.06   RBC 4.05*  --  3.98* 3.37*   HGB 13.3*  --  13.3* 11.4*   HCT 41.1 43 42.4 34.3*     --  272 267   *  --  107* 102*   MCH 32.8*  --  33.4* 33.8*   MCHC 32.4  --  31.4* 33.2       Significant Diagnostics:  CT: I have reviewed all pertinent results/findings within the past 24 hours:  concerning for recurrence in retroperitoneum with hydroureter and duodenal narrowing at 2nd portion

## 2018-05-02 ENCOUNTER — ANESTHESIA EVENT (OUTPATIENT)
Dept: SURGERY | Facility: HOSPITAL | Age: 83
DRG: 375 | End: 2018-05-02
Payer: MEDICARE

## 2018-05-02 LAB
ANION GAP SERPL CALC-SCNC: 10 MMOL/L
BASOPHILS # BLD AUTO: 0.05 K/UL
BASOPHILS NFR BLD: 0.7 %
BUN SERPL-MCNC: 26 MG/DL
CALCIUM SERPL-MCNC: 10 MG/DL
CHLORIDE SERPL-SCNC: 111 MMOL/L
CO2 SERPL-SCNC: 18 MMOL/L
CREAT SERPL-MCNC: 1.4 MG/DL
DIFFERENTIAL METHOD: ABNORMAL
EOSINOPHIL # BLD AUTO: 0.1 K/UL
EOSINOPHIL NFR BLD: 1.2 %
ERYTHROCYTE [DISTWIDTH] IN BLOOD BY AUTOMATED COUNT: 13.3 %
EST. GFR  (AFRICAN AMERICAN): 51.1 ML/MIN/1.73 M^2
EST. GFR  (NON AFRICAN AMERICAN): 44.2 ML/MIN/1.73 M^2
GLUCOSE SERPL-MCNC: 74 MG/DL
HCT VFR BLD AUTO: 31.4 %
HGB BLD-MCNC: 10.1 G/DL
IMM GRANULOCYTES # BLD AUTO: 0.02 K/UL
IMM GRANULOCYTES NFR BLD AUTO: 0.3 %
LYMPHOCYTES # BLD AUTO: 1 K/UL
LYMPHOCYTES NFR BLD: 13.7 %
MAGNESIUM SERPL-MCNC: 1.7 MG/DL
MCH RBC QN AUTO: 33.1 PG
MCHC RBC AUTO-ENTMCNC: 32.2 G/DL
MCV RBC AUTO: 103 FL
MONOCYTES # BLD AUTO: 0.7 K/UL
MONOCYTES NFR BLD: 9.4 %
NEUTROPHILS # BLD AUTO: 5.4 K/UL
NEUTROPHILS NFR BLD: 74.7 %
NRBC BLD-RTO: 0 /100 WBC
PHOSPHATE SERPL-MCNC: 3.2 MG/DL
PLATELET # BLD AUTO: 246 K/UL
PMV BLD AUTO: 10.2 FL
POTASSIUM SERPL-SCNC: 4.1 MMOL/L
RBC # BLD AUTO: 3.05 M/UL
SODIUM SERPL-SCNC: 139 MMOL/L
WBC # BLD AUTO: 7.25 K/UL

## 2018-05-02 PROCEDURE — 84100 ASSAY OF PHOSPHORUS: CPT

## 2018-05-02 PROCEDURE — 80048 BASIC METABOLIC PNL TOTAL CA: CPT

## 2018-05-02 PROCEDURE — 25000003 PHARM REV CODE 250: Performed by: SURGERY

## 2018-05-02 PROCEDURE — 99233 SBSQ HOSP IP/OBS HIGH 50: CPT | Mod: GC,,, | Performed by: INTERNAL MEDICINE

## 2018-05-02 PROCEDURE — 83735 ASSAY OF MAGNESIUM: CPT

## 2018-05-02 PROCEDURE — 25000003 PHARM REV CODE 250: Performed by: NURSE PRACTITIONER

## 2018-05-02 PROCEDURE — 99222 1ST HOSP IP/OBS MODERATE 55: CPT | Mod: ,,, | Performed by: SURGERY

## 2018-05-02 PROCEDURE — 20600001 HC STEP DOWN PRIVATE ROOM

## 2018-05-02 PROCEDURE — 85025 COMPLETE CBC W/AUTO DIFF WBC: CPT

## 2018-05-02 PROCEDURE — 99231 SBSQ HOSP IP/OBS SF/LOW 25: CPT | Mod: GC,,, | Performed by: COLON & RECTAL SURGERY

## 2018-05-02 RX ORDER — HYDRALAZINE HYDROCHLORIDE 20 MG/ML
10 INJECTION INTRAMUSCULAR; INTRAVENOUS EVERY 8 HOURS PRN
Status: DISCONTINUED | OUTPATIENT
Start: 2018-05-02 | End: 2018-05-04 | Stop reason: HOSPADM

## 2018-05-02 RX ORDER — METOPROLOL TARTRATE 1 MG/ML
5 INJECTION, SOLUTION INTRAVENOUS EVERY 6 HOURS
Status: DISCONTINUED | OUTPATIENT
Start: 2018-05-02 | End: 2018-05-04 | Stop reason: HOSPADM

## 2018-05-02 RX ADMIN — SODIUM CHLORIDE: 0.9 INJECTION, SOLUTION INTRAVENOUS at 06:05

## 2018-05-02 RX ADMIN — METOPROLOL TARTRATE 5 MG: 5 INJECTION, SOLUTION INTRAVENOUS at 12:05

## 2018-05-02 RX ADMIN — METOPROLOL TARTRATE 5 MG: 5 INJECTION, SOLUTION INTRAVENOUS at 11:05

## 2018-05-02 RX ADMIN — METOPROLOL TARTRATE 5 MG: 5 INJECTION, SOLUTION INTRAVENOUS at 06:05

## 2018-05-02 NOTE — ASSESSMENT & PLAN NOTE
Crampy abdominal pain, reflux/belching and inability to tolerate PO along with decreased bowel function likely recurrent partial SBO.     - NPO   -mIVF  - Continue NG tube   -PRN pain medications   -daily labs   - IR biopsy of liver lesion or peritoneal met as outpatient  - Palliative PEG this week by General Surgery

## 2018-05-02 NOTE — ASSESSMENT & PLAN NOTE
88 yo male w Stage II Colon Cancer s/p right hemicolectomy in 07/2017, recent partial SBO which resolved with conservative management, and history of incarcerated inguinal hernia s/p repair, no presenting with recurrent pSBO  -plan for palliative PEG placement in OR  -timing to be discussed with staff  -consents to be obtained

## 2018-05-02 NOTE — PLAN OF CARE
Problem: Patient Care Overview  Goal: Plan of Care Review  Outcome: Ongoing (interventions implemented as appropriate)  POC reviewed with patient and daughter who both verbalized understanding. AAOx4. VSS on RA. Skin free of any breakdown. Left nare NGT in place to LIWS with small amount of brown output overnight. Denies any pain or nausea. IVF infusing, up with standby assist to bathroom to urinate, passing gas. Safety precautions maintained, call light within reach. Refusing BLE SCDS. Remains free from falls and injury. No acute events. No distress noted. Will continue to monitor.

## 2018-05-02 NOTE — HPI
Sushant Cruz is an 88 y/o M PMHx of Atrial Fibrillation, Stage II Colon Cancer s/p right hemicolectomy in 07/2017, recent partial SBO which resolved with conservative management, and history of incarcerated inguinal hernia s/p repair who presented to the ED complaining of episodic lower abdominal pain. Repeat imaging concerning for moderate right hydronephrosis and small-bowel obstruction similar to prior exam with transition point within the right lower quadrant. He had a PET/CT Scan done today which is suggestive of peritoneal carcinomatosis and a single liver metastasis. There may be obstruction of the distal left ureter from peritoneal implants. He denies nausea, vomiting. Passing gas and having bowel movements. General surgery has been consulted for palliative PEG placement.

## 2018-05-02 NOTE — PROGRESS NOTES
[facility]  General Surgery  Progress Note    Subjective:     History of Present Illness:  Sushant Cruz is an 88 y/o M PMHx of Atrial Fibrillation, Stage II Colon Cancer s/p right hemicolectomy in 07/2017, recent partial SBO which resolved with conservative management, and history of incarcerated inguinal hernia s/p repair who presented to the ED complaining of episodic lower abdominal pain. Repeat imaging concerning for moderate right hydronephrosis and small-bowel obstruction similar to prior exam with transition point within the right lower quadrant. He had a PET/CT Scan done today which is suggestive of peritoneal carcinomatosis and a single liver metastasis. There may be obstruction of the distal left ureter from peritoneal implants. He denies nausea, vomiting. Passing gas and having bowel movements. General surgery has been consulted for palliative PEG placement.    Post-Op Info:  * No surgery found *         No current facility-administered medications on file prior to encounter.      Current Outpatient Prescriptions on File Prior to Encounter   Medication Sig    cyanocobalamin 2000 MCG tablet Take 2,000 mcg by mouth once daily.    DABIGATRAN ETEXILATE MESYLATE (PRADAXA ORAL) Take by mouth.    ferrous sulfate 325 mg (65 mg iron) Tab tablet Take 325 mg by mouth 3 (three) times daily.    metoprolol succinate (TOPROL-XL) 25 MG 24 hr tablet TK 1 T PO QD       Review of patient's allergies indicates:  No Known Allergies    Past Medical History:   Diagnosis Date    Atrial fibrillation     Cancer     colon    Inguinal hernia     ruptured inguinal hernia     Past Surgical History:   Procedure Laterality Date    COLON SURGERY      HERNIA REPAIR       Family History     Problem Relation (Age of Onset)    Heart disease Father    Stroke Mother        Social History Main Topics    Smoking status: Never Smoker    Smokeless tobacco: Never Used    Alcohol use Yes      Comment: social     Drug use: No    Sexual activity: No     Review of Systems   Constitutional: Negative for chills and fever.   HENT: Negative for congestion and trouble swallowing.    Eyes: Negative for photophobia and visual disturbance.   Respiratory: Negative for cough and shortness of breath.    Cardiovascular: Negative for chest pain and palpitations.   Gastrointestinal: Negative for abdominal pain, nausea and vomiting.   Musculoskeletal: Negative for arthralgias and myalgias.   Skin: Negative for rash and wound.   Neurological: Negative for tremors and weakness.     Objective:     Vital Signs (Most Recent):  Temp: 98.2 °F (36.8 °C) (05/02/18 0824)  Pulse: 75 (05/02/18 0824)  Resp: 16 (05/02/18 0824)  BP: (!) 155/75 (05/02/18 0824)  SpO2: (!) 94 % (05/02/18 0824) Vital Signs (24h Range):  Temp:  [98.2 °F (36.8 °C)-98.9 °F (37.2 °C)] 98.2 °F (36.8 °C)  Pulse:  [] 75  Resp:  [16-18] 16  SpO2:  [94 %-95 %] 94 %  BP: (135-157)/(73-85) 155/75     Weight: 72.6 kg (160 lb)  Body mass index is 23.63 kg/m².    Physical Exam   Constitutional: He is oriented to person, place, and time. He appears well-developed and well-nourished. No distress.   HENT:   Head: Normocephalic and atraumatic.   NGT in place    Eyes: EOM are normal. No scleral icterus.   Neck: Normal range of motion. Neck supple. No tracheal deviation present.   Cardiovascular: Normal rate and regular rhythm.    Pulmonary/Chest: Effort normal. No respiratory distress.   Abdominal:   Soft, NTND, small well healed midline incision noted   Neurological: He is alert and oriented to person, place, and time.   Skin: Skin is warm and dry.   Psychiatric: He has a normal mood and affect. His behavior is normal.       Significant Labs:  CBC:   Recent Labs  Lab 05/02/18  0420   WBC 7.25   RBC 3.05*   HGB 10.1*   HCT 31.4*      *   MCH 33.1*   MCHC 32.2     BMP:   Recent Labs  Lab 05/02/18  0420   GLU 74      K 4.1   *   CO2 18*   BUN 26*   CREATININE 1.4    CALCIUM 10.0   MG 1.7     CMP:   Recent Labs  Lab 04/29/18  1234  05/02/18  0420     < > 74   CALCIUM 11.4*  < > 10.0   ALBUMIN 3.8  --   --    PROT 8.3  --   --      < > 139   K 4.9  < > 4.1   CO2 22*  < > 18*     < > 111*   BUN 25*  < > 26*   CREATININE 1.6*  < > 1.4   ALKPHOS 82  --   --    ALT 8*  --   --    AST 15  --   --    BILITOT 1.4*  --   --    < > = values in this interval not displayed.  LFTs:   Recent Labs  Lab 04/29/18  1234   ALT 8*   AST 15   ALKPHOS 82   BILITOT 1.4*   PROT 8.3   ALBUMIN 3.8     Cardiac markers:   Recent Labs  Lab 04/29/18  1234   TROPONINI <0.006       Significant Diagnostics:  CT 4/29/18 reviewed by me    Assessment/Plan:     * SBO (small bowel obstruction)    89 yo male w Stage II Colon Cancer s/p right hemicolectomy in 07/2017, recent partial SBO which resolved with conservative management, and history of incarcerated inguinal hernia s/p repair, no presenting with recurrent pSBO  -plan for palliative PEG placement in OR  -timing to be discussed with staff  -consents to be obtained            Melonie Eduardo PA-C   [id number]  General Surgery  [facility]

## 2018-05-02 NOTE — SUBJECTIVE & OBJECTIVE
No current facility-administered medications on file prior to encounter.      Current Outpatient Prescriptions on File Prior to Encounter   Medication Sig    cyanocobalamin 2000 MCG tablet Take 2,000 mcg by mouth once daily.    DABIGATRAN ETEXILATE MESYLATE (PRADAXA ORAL) Take by mouth.    ferrous sulfate 325 mg (65 mg iron) Tab tablet Take 325 mg by mouth 3 (three) times daily.    metoprolol succinate (TOPROL-XL) 25 MG 24 hr tablet TK 1 T PO QD       Review of patient's allergies indicates:  No Known Allergies    Past Medical History:   Diagnosis Date    Atrial fibrillation     Cancer     colon    Inguinal hernia     ruptured inguinal hernia     Past Surgical History:   Procedure Laterality Date    COLON SURGERY      HERNIA REPAIR       Family History     Problem Relation (Age of Onset)    Heart disease Father    Stroke Mother        Social History Main Topics    Smoking status: Never Smoker    Smokeless tobacco: Never Used    Alcohol use Yes      Comment: social    Drug use: No    Sexual activity: No     Review of Systems   Constitutional: Negative for chills and fever.   HENT: Negative for congestion and trouble swallowing.    Eyes: Negative for photophobia and visual disturbance.   Respiratory: Negative for cough and shortness of breath.    Cardiovascular: Negative for chest pain and palpitations.   Gastrointestinal: Negative for abdominal pain, nausea and vomiting.   Musculoskeletal: Negative for arthralgias and myalgias.   Skin: Negative for rash and wound.   Neurological: Negative for tremors and weakness.     Objective:     Vital Signs (Most Recent):  Temp: 98.2 °F (36.8 °C) (05/02/18 0824)  Pulse: 75 (05/02/18 0824)  Resp: 16 (05/02/18 0824)  BP: (!) 155/75 (05/02/18 0824)  SpO2: (!) 94 % (05/02/18 0824) Vital Signs (24h Range):  Temp:  [98.2 °F (36.8 °C)-98.9 °F (37.2 °C)] 98.2 °F (36.8 °C)  Pulse:  [] 75  Resp:  [16-18] 16  SpO2:  [94 %-95 %] 94 %  BP: (135-157)/(73-85) 155/75      Weight: 72.6 kg (160 lb)  Body mass index is 23.63 kg/m².    Physical Exam   Constitutional: He is oriented to person, place, and time. He appears well-developed and well-nourished. No distress.   HENT:   Head: Normocephalic and atraumatic.   NGT in place    Eyes: EOM are normal. No scleral icterus.   Neck: Normal range of motion. Neck supple. No tracheal deviation present.   Cardiovascular: Normal rate and regular rhythm.    Pulmonary/Chest: Effort normal. No respiratory distress.   Abdominal:   Soft, NTND, small well healed midline incision noted   Neurological: He is alert and oriented to person, place, and time.   Skin: Skin is warm and dry.   Psychiatric: He has a normal mood and affect. His behavior is normal.       Significant Labs:  CBC:   Recent Labs  Lab 05/02/18  0420   WBC 7.25   RBC 3.05*   HGB 10.1*   HCT 31.4*      *   MCH 33.1*   MCHC 32.2     BMP:   Recent Labs  Lab 05/02/18  0420   GLU 74      K 4.1   *   CO2 18*   BUN 26*   CREATININE 1.4   CALCIUM 10.0   MG 1.7     CMP:   Recent Labs  Lab 04/29/18  1234  05/02/18  0420     < > 74   CALCIUM 11.4*  < > 10.0   ALBUMIN 3.8  --   --    PROT 8.3  --   --      < > 139   K 4.9  < > 4.1   CO2 22*  < > 18*     < > 111*   BUN 25*  < > 26*   CREATININE 1.6*  < > 1.4   ALKPHOS 82  --   --    ALT 8*  --   --    AST 15  --   --    BILITOT 1.4*  --   --    < > = values in this interval not displayed.  LFTs:   Recent Labs  Lab 04/29/18  1234   ALT 8*   AST 15   ALKPHOS 82   BILITOT 1.4*   PROT 8.3   ALBUMIN 3.8     Cardiac markers:   Recent Labs  Lab 04/29/18  1234   TROPONINI <0.006       Significant Diagnostics:  CT 4/29/18 reviewed by me

## 2018-05-02 NOTE — ANESTHESIA PREPROCEDURE EVALUATION
[facility]  Anesthesia Pre-Operative Evaluation         Patient Name: Sushant Cruz  YOB: 1929  MRN: [id number]    SUBJECTIVE:     Pre-operative evaluation for Procedure(s) (LRB):  PLACEMENT-TUBE-PEG (N/A)     05/02/2018    Sushant Cruz is a 89 y.o. male w/ a significant PMHx of Atrial Fibrillation, Stage II Colon Cancer s/p right hemicolectomy in 07/2017, recent partial SBO which resolved with conservative management, and history of incarcerated inguinal hernia s/p repair who presented to the ED complaining of episodic lower abdominal pain. Repeat imaging concerning for moderate right hydronephrosis and small-bowel obstruction similar to prior exam with transition point within the right lower quadrant. He had a PET/CT Scan done which was suggestive of peritoneal carcinomatosis and a single liver metastasis. Patient now with continued inability to tolerate PO along with decreased bowel function. In addition, the patient is experiencing cramping abdominal pain w/ increased reflux/belching. Generally surgery believes SBO is likely recurring. Patient currently has NG tube in place. Patient now presents for the above procedure(s) for palliative purposes.       LDA:        PICC Double Lumen 05/01/18 1125 right basilic (Active)   Site Assessment Clean;Dry;Intact;No redness;No swelling 5/1/2018  8:20 PM   Lumen 1 Status Flushed;Infusing;Blood return noted 5/1/2018  8:20 PM   Lumen 2 Status Flushed;Normal saline locked;Blood return noted 5/1/2018  8:20 PM   Length landon (cm) 34 cm 5/1/2018 11:29 AM   Current Exposed Catheter (cm) 0 cm 5/1/2018 11:29 AM   Extremity Circumference (cm) 28 cm 5/1/2018 11:29 AM   Dressing Type Transparent 5/1/2018  8:20 PM   Dressing Status Biopatch in place;Clean;Dry;Intact 5/1/2018  8:20 PM   Dressing Intervention New dressing 5/1/2018  8:20 PM   Dressing Change Due 05/08/18 5/1/2018  8:20 PM   Daily Line Review Performed 5/1/2018  8:20 PM   Number of days:  0            NG/OG Tube 04/29/18 2045 [place] sump 14 Fr. Left nostril (Active)   Placement Check placement verified by aspirate characteristics 5/1/2018  8:20 PM   Tolerance no signs/symptoms of discomfort 5/1/2018  8:20 PM   Securement taped to nostril center 5/1/2018  8:20 PM   Clamp Status/Tolerance unclamped 5/1/2018  8:20 PM   Suction Setting/Drainage Method suction at;low;intermittent setting 5/1/2018  8:20 PM   Insertion Site Appearance no redness, warmth, tenderness, skin breakdown, drainage 5/1/2018  8:20 PM   Drainage Brown 5/1/2018  8:20 PM   Flush/Irrigation flushed w/;water;no resistance met 4/30/2018  8:00 PM   Tube Output(mL)(Include Discarded Residual) 50 mL 5/2/2018  4:20 AM   Number of days: 2       Prev airway:     Present Prior to Hospital Arrival?: No; Placement Date: 07/03/17; Placement Time: 0841; Method of Intubation: Direct laryngoscopy; Inserted by: CRNA; Airway Device: Endotracheal Tube; Mask Ventilation: Easy - oral; Intubated: Postinduction; Blade: Boyd #2; Airway Device Size: 7.5; Style: Cuffed; Cuff Inflation: Minimal occlusive pressure; Inflation Amount: 3; Placement Verified By: Auscultation, Capnometry, ETT Condensation; Grade: Grade I; Complicating Factors: Large/Floppy epiglottis; Intubation Findings: Positive EtCO2, Bilateral breath sounds, Atraumatic/Condition of teeth unchanged;  Depth of Insertion: 23; Securment: Lips; Complications: None; Breath Sounds: Equal Bilateral; Insertion Attempts: 1; Removal Date: 07/03/17;  Removal Time: 1104    Drips:    sodium chloride 0.9% 75 mL/hr at 05/02/18 0605       Patient Active Problem List   Diagnosis    Malignant neoplasm of ascending colon    Chronic atrial fibrillation    Hematuria    History of colon cancer, stage II    SBO (small bowel obstruction)    Hydronephrosis    Abdominal pain    Small bowel obstruction    Secondary malignant peritoneal deposit    Secondary liver cancer       Review of patient's allergies  indicates:  No Known Allergies    Current Inpatient Medications:   metoprolol  5 mg Intravenous Q6H       No current facility-administered medications on file prior to encounter.      Current Outpatient Prescriptions on File Prior to Encounter   Medication Sig Dispense Refill    cyanocobalamin 2000 MCG tablet Take 2,000 mcg by mouth once daily.      DABIGATRAN ETEXILATE MESYLATE (PRADAXA ORAL) Take by mouth.      ferrous sulfate 325 mg (65 mg iron) Tab tablet Take 325 mg by mouth 3 (three) times daily.      metoprolol succinate (TOPROL-XL) 25 MG 24 hr tablet TK 1 T PO QD  0       Past Surgical History:   Procedure Laterality Date    COLON SURGERY      HERNIA REPAIR         Social History     Social History    Marital status:      Spouse name: N/A    Number of children: N/A    Years of education: N/A     Occupational History    Not on file.     Social History Main Topics    Smoking status: Never Smoker    Smokeless tobacco: Never Used    Alcohol use Yes      Comment: social    Drug use: No    Sexual activity: No     Other Topics Concern    Not on file     Social History Narrative    No narrative on file       OBJECTIVE:     Vital Signs Range (Last 24H):  Temp:  [36.8 °C (98.2 °F)-37.2 °C (98.9 °F)]   Pulse:  []   Resp:  [16-18]   BP: (135-157)/(73-85)   SpO2:  [94 %-95 %]       CBC:   Recent Labs      05/01/18   0409  05/02/18   0420   WBC  8.06  7.25   RBC  3.37*  3.05*   HGB  11.4*  10.1*   HCT  34.3*  31.4*   PLT  267  246   MCV  102*  103*   MCH  33.8*  33.1*   MCHC  33.2  32.2       CMP:   Recent Labs      04/29/18   1234   05/01/18   0409  05/02/18   0420   NA  137   < >  135*  139   K  4.9   < >  4.2  4.1   CL  105   < >  109  111*   CO2  22*   < >  20*  18*   BUN  25*   < >  28*  26*   CREATININE  1.6*   < >  1.3  1.4   GLU  100   < >  83  74   MG   --    < >  1.6  1.7   PHOS   --    < >  2.7  3.2   CALCIUM  11.4*   < >  10.2  10.0   ALBUMIN  3.8   --    --    --    PROT  8.3    --    --    --    ALKPHOS  82   --    --    --    ALT  8*   --    --    --    AST  15   --    --    --    BILITOT  1.4*   --    --    --     < > = values in this interval not displayed.       INR:  No results for input(s): PT, INR, PROTIME, APTT in the last 72 hours.    Diagnostic Studies:     EK/10/18    Test Reason : R07.89,  Vent. Rate : 073 BPM     Atrial Rate : 117 BPM     P-R Int : 000 ms          QRS Dur : 082 ms      QT Int : 398 ms       P-R-T Axes : 000 -76 041 degrees     QTc Int : 438 ms    Atrial fibrillation  Left anterior fascicular block  Abnormal ECG  When compared with ECG of 2017 12:01,  QT has lengthened  Confirmed by Luciano Shetty MD ([id number]) on 2018 7:56:59 AM    2D ECHO:  No results found for this or any previous visit.      ASSESSMENT/PLAN:         Anesthesia Evaluation    I have reviewed the Patient Summary Reports.    I have reviewed the Nursing Notes.      Review of Systems  Anesthesia Hx:  History of prior surgery of interest to airway management or planning: Denies Family Hx of Anesthesia complications.   Denies Personal Hx of Anesthesia complications.   Hematology/Oncology:        Current/Recent Cancer. Oncology Comments: Stage II Colon Cancer s/p right hemicolectomy in 2017  Now with Possible liver mets and peritoneal carcinomatosis    EENT/Dental:EENT/Dental Normal   Cardiovascular:   Dysrhythmias atrial fibrillation    Pulmonary:  Pulmonary Normal    Renal/:   Chronic Renal Disease Hydronephrosis    Hepatic/GI:   GERD, poorly controlled Liver Disease, Possible liver mets  peritoneal carcinomatosis   SBO  cramping abdomina pain w/ increased reflux/belching   Musculoskeletal:  Musculoskeletal Normal    Neurological:  Neurology Normal    Endocrine:  Endocrine Normal    Dermatological:  Skin Normal    Psych:  Psychiatric Normal           Physical Exam  General:  Malnutrition    Airway/Jaw/Neck:  Airway Findings: Mouth Opening: Normal Tongue: Normal  General Airway  Assessment: Adult  Mallampati: II  TM Distance: Normal, at least 6 cm  Jaw/Neck Findings:  Neck ROM: Normal ROM  Neck Findings: Normal    Eyes/Ears/Nose:  EYES/EARS/NOSE FINDINGS: Normal   Dental:  Dental Findings: Periodontal disease, Mild   Chest/Lungs:  Chest/Lungs Findings: Clear to auscultation, Normal Respiratory Rate     Heart/Vascular:  Heart Findings: Rate: Normal  Rhythm: Irregularly Irregular  Sounds: Normal     Abdomen:  Abdomen Findings: Normal    Musculoskeletal:  Musculoskeletal Findings: Normal   Skin:  Skin Findings: Normal    Mental Status:  Mental Status Findings:  Cooperative, Alert and Oriented         Anesthesia Plan  Type of Anesthesia, risks & benefits discussed:  Anesthesia Type:  general, MAC  Patient's Preference:   Intra-op Monitoring Plan: standard ASA monitors  Intra-op Monitoring Plan Comments:   Post Op Pain Control Plan: multimodal analgesia and per primary service following discharge from PACU  Post Op Pain Control Plan Comments:   Induction:   IV  Beta Blocker:  Patient is on a Beta-Blocker and has received one dose within the past 24 hours (No further documentation required).       Informed Consent: Patient representative understands risks and agrees with Anesthesia plan.  Questions answered. Anesthesia consent signed with patient representative.  ASA Score: 3     Day of Surgery Review of History & Physical:  There are no significant changes.  H&P update referred to the surgeon.         Ready For Surgery From Anesthesia Perspective.

## 2018-05-02 NOTE — PROGRESS NOTES
[facility]  Colorectal Surgery  Progress Note    Patient Name: Sushant Cruz  MRN: [id number]  Admission Date: 4/29/2018  Hospital Length of Stay: 3 days  Attending Physician: Robin Schwartz MD    Subjective:     Interval History: No acute events overnight. NGT with 350cc thin clear output.  BM x1 yesterday.  No pain.    Post-Op Info:  * No surgery found *          Medications:  Continuous Infusions:   sodium chloride 0.9% 75 mL/hr at 05/02/18 0605     Scheduled Meds:  PRN Meds:   acetaminophen    HYDROmorphone    ondansetron    sodium chloride 0.9%        Objective:     Vital Signs (Most Recent):  Temp: 98.2 °F (36.8 °C) (05/02/18 0824)  Pulse: 75 (05/02/18 0824)  Resp: 16 (05/02/18 0824)  BP: (!) 155/75 (05/02/18 0824)  SpO2: (!) 94 % (05/02/18 0824) Vital Signs (24h Range):  Temp:  [98.2 °F (36.8 °C)-98.9 °F (37.2 °C)] 98.2 °F (36.8 °C)  Pulse:  [] 75  Resp:  [16-18] 16  SpO2:  [94 %-95 %] 94 %  BP: (135-157)/(73-85) 155/75     Intake/Output - Last 3 Shifts       04/30 0700 - 05/01 0659 05/01 0700 - 05/02 0659 05/02 0700 - 05/03 0659    P.O. 0 0     I.V. (mL/kg) 1666.3 (23) 1806.3 (24.9)     Total Intake(mL/kg) 1666.3 (23) 1806.3 (24.9)     Drains 350 450     Total Output 350 450      Net +1316.3 +1356.3             Urine Occurrence 6 x 7 x     Stool Occurrence 0 x 1 x     Emesis Occurrence 0 x 0 x           Physical Exam   Constitutional: He is oriented to person, place, and time. He appears well-developed and well-nourished.   HENT:   Head: Normocephalic and atraumatic.   Eyes: EOM are normal.   Cardiovascular: Normal rate and regular rhythm.    Pulmonary/Chest: Effort normal. No respiratory distress.   Abdominal: Soft. He exhibits distension (mild in upper abdomen). He exhibits no mass. There is no tenderness. There is no rebound and no guarding. No hernia.   NG with clear thin output  Previous midline incision well healed   Musculoskeletal: Normal range of motion.   Neurological:  He is alert and oriented to person, place, and time.   Skin: Skin is warm.   Psychiatric: He has a normal mood and affect. His behavior is normal.   Nursing note and vitals reviewed.      Significant Labs:  BMP (Last 3 Results):     Recent Labs  Lab 04/30/18 0533 05/01/18  0409 05/02/18  0420   * 83 74   * 135* 139   K 4.3 4.2 4.1    109 111*   CO2 21* 20* 18*   BUN 28* 28* 26*   CREATININE 1.3 1.3 1.4   CALCIUM 10.5 10.2 10.0   MG 1.6 1.6 1.7     CBC (Last 3 Results):     Recent Labs  Lab 04/30/18 0533 05/01/18 0409 05/02/18  0420   WBC 8.07 8.06 7.25   RBC 3.98* 3.37* 3.05*   HGB 13.3* 11.4* 10.1*   HCT 42.4 34.3* 31.4*    267 246   * 102* 103*   MCH 33.4* 33.8* 33.1*   MCHC 31.4* 33.2 32.2       Significant Diagnostics:  CT: I have reviewed all pertinent results/findings within the past 24 hours:  concerning for recurrence in retroperitoneum with hydroureter and duodenal narrowing at 2nd portion    Assessment/Plan:     * SBO (small bowel obstruction)    Crampy abdominal pain, reflux/belching and inability to tolerate PO along with decreased bowel function likely recurrent partial SBO.     - NPO   -mIVF  - Continue NG tube   -PRN pain medications   -daily labs   - IR biopsy of liver lesion or peritoneal met as outpatient  - Palliative PEG this week by General Surgery        Chronic atrial fibrillation    Hold anticoagulation for PEG placement  IV lopressor 5mg Q6              Sonny Ferrari MD  Colorectal Surgery  [facility]

## 2018-05-02 NOTE — SUBJECTIVE & OBJECTIVE
Subjective:     Interval History: No acute events overnight. NGT with 350cc thin clear output.  BM x1 yesterday.  No pain.    Post-Op Info:  * No surgery found *          Medications:  Continuous Infusions:   sodium chloride 0.9% 75 mL/hr at 05/02/18 0605     Scheduled Meds:  PRN Meds:   acetaminophen    HYDROmorphone    ondansetron    sodium chloride 0.9%        Objective:     Vital Signs (Most Recent):  Temp: 98.2 °F (36.8 °C) (05/02/18 0824)  Pulse: 75 (05/02/18 0824)  Resp: 16 (05/02/18 0824)  BP: (!) 155/75 (05/02/18 0824)  SpO2: (!) 94 % (05/02/18 0824) Vital Signs (24h Range):  Temp:  [98.2 °F (36.8 °C)-98.9 °F (37.2 °C)] 98.2 °F (36.8 °C)  Pulse:  [] 75  Resp:  [16-18] 16  SpO2:  [94 %-95 %] 94 %  BP: (135-157)/(73-85) 155/75     Intake/Output - Last 3 Shifts       04/30 0700 - 05/01 0659 05/01 0700 - 05/02 0659 05/02 0700 - 05/03 0659    P.O. 0 0     I.V. (mL/kg) 1666.3 (23) 1806.3 (24.9)     Total Intake(mL/kg) 1666.3 (23) 1806.3 (24.9)     Drains 350 450     Total Output 350 450      Net +1316.3 +1356.3             Urine Occurrence 6 x 7 x     Stool Occurrence 0 x 1 x     Emesis Occurrence 0 x 0 x           Physical Exam   Constitutional: He is oriented to person, place, and time. He appears well-developed and well-nourished.   HENT:   Head: Normocephalic and atraumatic.   Eyes: EOM are normal.   Cardiovascular: Normal rate and regular rhythm.    Pulmonary/Chest: Effort normal. No respiratory distress.   Abdominal: Soft. He exhibits distension (mild in upper abdomen). He exhibits no mass. There is no tenderness. There is no rebound and no guarding. No hernia.   NG with clear thin output  Previous midline incision well healed   Musculoskeletal: Normal range of motion.   Neurological: He is alert and oriented to person, place, and time.   Skin: Skin is warm.   Psychiatric: He has a normal mood and affect. His behavior is normal.   Nursing note and vitals reviewed.      Significant Labs:  BMP  (Last 3 Results):     Recent Labs  Lab 04/30/18  0533 05/01/18  0409 05/02/18  0420   * 83 74   * 135* 139   K 4.3 4.2 4.1    109 111*   CO2 21* 20* 18*   BUN 28* 28* 26*   CREATININE 1.3 1.3 1.4   CALCIUM 10.5 10.2 10.0   MG 1.6 1.6 1.7     CBC (Last 3 Results):     Recent Labs  Lab 04/30/18  0533 05/01/18  0409 05/02/18  0420   WBC 8.07 8.06 7.25   RBC 3.98* 3.37* 3.05*   HGB 13.3* 11.4* 10.1*   HCT 42.4 34.3* 31.4*    267 246   * 102* 103*   MCH 33.4* 33.8* 33.1*   MCHC 31.4* 33.2 32.2       Significant Diagnostics:  CT: I have reviewed all pertinent results/findings within the past 24 hours:  concerning for recurrence in retroperitoneum with hydroureter and duodenal narrowing at 2nd portion

## 2018-05-02 NOTE — PROGRESS NOTES
[facility]  Hematology/Oncology  Progress Note    Patient Name: Sushant Cruz  Admission Date: 4/29/2018  Hospital Length of Stay: 3 days  Code Status: Full Code     Subjective:     Interval History:     - clinically doing better, NGT out now.   - ambulating and OOB to chair.   - nausea improving.     Medications:  Continuous Infusions:   sodium chloride 0.9% 75 mL/hr at 05/02/18 0605     Scheduled Meds:   metoprolol  5 mg Intravenous Q6H     PRN Meds:acetaminophen, HYDROmorphone, ondansetron, sodium chloride 0.9%     Review of Systems   Constitutional: Positive for fatigue. Negative for fever.   Respiratory: Negative for shortness of breath.    Gastrointestinal: Positive for abdominal pain and nausea.     Objective:     Vital Signs (Most Recent):  Temp: 98.2 °F (36.8 °C) (05/02/18 0824)  Pulse: 85 (05/02/18 1446)  Resp: 16 (05/02/18 1247)  BP: 134/77 (05/02/18 1446)  SpO2: 96 % (05/02/18 1446) Vital Signs (24h Range):  Temp:  [98.2 °F (36.8 °C)-98.8 °F (37.1 °C)] 98.2 °F (36.8 °C)  Pulse:  [] 85  Resp:  [16-18] 16  SpO2:  [94 %-97 %] 96 %  BP: (134-165)/(74-86) 134/77     Weight: 72.6 kg (160 lb)  Body mass index is 23.63 kg/m².  Body surface area is 1.88 meters squared.      Intake/Output Summary (Last 24 hours) at 05/02/18 1456  Last data filed at 05/02/18 0420   Gross per 24 hour   Intake          1806.25 ml   Output              450 ml   Net          1356.25 ml       Physical Exam  Constitutional: He is oriented to person, place, and time. He appears well-developed and well-nourished.   HENT:   Head: Normocephalic and atraumatic.   Eyes: EOM are normal.   Cardiovascular: Normal rate and regular rhythm.    Pulmonary/Chest: Effort normal. No respiratory distress.   Abdominal: Soft. He exhibits distension. He exhibits no mass. There is no tenderness.   Musculoskeletal: Normal range of motion.   Neurological: He is alert and oriented to person, place, and time.   Skin: Skin is warm.    Psychiatric: He has a normal mood and affect. His behavior is normal.     Significant Labs:   CBC:   Recent Labs  Lab 05/01/18  0409 05/02/18  0420   WBC 8.06 7.25   HGB 11.4* 10.1*   HCT 34.3* 31.4*    246    and CMP:   Recent Labs  Lab 05/01/18  0409 05/02/18  0420   * 139   K 4.2 4.1    111*   CO2 20* 18*   GLU 83 74   BUN 28* 26*   CREATININE 1.3 1.4   CALCIUM 10.2 10.0   ANIONGAP 6* 10   EGFRNONAA 48.4* 44.2*       Diagnostic Results:  I have reviewed all pertinent imaging results/findings within the past 24 hours.    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 Answered all questions to their satisfaction.   - patient was initially diagnosed with Stage II Colon Cancer in July, 2017 s/p right hemicolectomy now with PET/CT Scan findings suggestive of peritoneal carcinomatosis leading to partial SBO. CEA only 2.8 ng/mL.  - recommend obtaining biopsy of the peritoneal implant/liver if feasible to prove recurrent colon cancer vs new primary, given he only had an early stage well differentiated cancer last year.   - given he had relatively good performance status prior to his recent admissions and no significant other comorbidites, perhaps he would be a good candidate for palliative  chemotherapy. Hoping he can be managed with only a venting G-Tube and can be seen in clinic soon afterward for evaluation.   - plan for palliative PEG placement by General Surgery  - likely outpatient IR guided biopsy of peritoneal implant/liver biopsy   - can follow up outpatient in Medical Oncology Clinic with Dr. Lucinda Dumont for further management and review of final pathology.       Thank you for your consult. I will follow-up with patient. Please contact us if you have any additional questions.     Pete Cortes MD  Hematology/Oncology  [facility]    STAFF NOTE:  I have personally taken the history and examined this patient and agree with Dr. Cortes's Note as stated above.  Discussed with both his daughters. Once the biopsy is completed then we can plan on molecular testing. Also once we know what the pathology reveals we can discuss appropriate chemo

## 2018-05-02 NOTE — H&P
Inpatient Radiology Pre-procedure Note    History of Present Illness:  Sushant Cruz is a 89 y.o. male with history of colon cancer s/p right hemicolectomy with evidence of peritoneal carcinomatosis and liver lesion on recent PET who presents for liver mass biopsy or peritoneal mass biopsy.    Of note, pt's daughter is contacting friends at [facility] and [facility] concerning specimen handling to ensure sufficient tissue is obtained.    Admission H&P reviewed.  Past Medical History:   Diagnosis Date    Atrial fibrillation     Cancer     colon    Inguinal hernia     ruptured inguinal hernia     Past Surgical History:   Procedure Laterality Date    COLON SURGERY      HERNIA REPAIR         Review of Systems:   As documented in primary team H&P    Home Meds:   Prior to Admission medications    Medication Sig Start Date End Date Taking? Authorizing Provider   cyanocobalamin 2000 MCG tablet Take 2,000 mcg by mouth once daily.    Historical Provider, MD   DABIGATRAN ETEXILATE MESYLATE (PRADAXA ORAL) Take by mouth.    Historical Provider, MD   ferrous sulfate 325 mg (65 mg iron) Tab tablet Take 325 mg by mouth 3 (three) times daily.    Historical Provider, MD   metoprolol succinate (TOPROL-XL) 25 MG 24 hr tablet TK 1 T PO QD 4/11/17   Historical Provider, MD     Scheduled Meds:    metoprolol  5 mg Intravenous Q6H     Continuous Infusions:    sodium chloride 0.9% 75 mL/hr at 05/02/18 0605     PRN Meds:acetaminophen, fentaNYL, hydrALAZINE, HYDROmorphone, ondansetron, sodium chloride 0.9%, sodium chloride 0.9%  Anticoagulants/Antiplatelets: Pradaxa, last dose 4/29/18    Allergies: Review of patient's allergies indicates:  No Known Allergies  Sedation Hx: have not been any systemic reactions    Labs:    Recent Labs  Lab 05/03/18  0441   INR 1.2         Recent Labs  Lab 05/03/18  0441   WBC 7.92   HGB 10.7*   HCT 32.7*   *         Recent Labs  Lab 04/29/18  1234  05/03/18  0441     < > 71   NA  137  < > 138   K 4.9  < > 4.2     < > 111*   CO2 22*  < > 18*   BUN 25*  < > 25*   CREATININE 1.6*  < > 1.4   CALCIUM 11.4*  < > 10.6*   MG  --   < > 1.6   ALT 8*  --   --    AST 15  --   --    ALBUMIN 3.8  --   --    BILITOT 1.4*  --   --    < > = values in this interval not displayed.      Vitals:  Temp: 97.9 °F (36.6 °C) (05/03/18 0902)  Pulse: 83 (05/03/18 0945)  Resp: 20 (05/03/18 0945)  BP: (!) 179/84 (05/03/18 0945)  SpO2: 95 % (05/03/18 0945)     Physical Exam:  ASA: 3  Mallampati: 2    General: no acute distress  Mental Status: alert and oriented to person, place and time  HEENT: normocephalic, atraumatic  Chest: unlabored breathing  Abdomen: nondistended  Extremity: moves all extremities    Plan: Image guided biopsy  Sedation Plan: Moderate          Keesha Capps MD  Resident  Department of Radiology  Pager: [phone]    Patient has a small peritoneal mass and a small liver lesion high in the dome. The risks of biopsying the liver lesion would be higher. Will attempt biopsy of the peritoneal lesion. If sample is not adequate, then we can consider liver biopsy. Informed consent obtained.    Shun Casanova MD  Department of Radiology  Pager: [phone]

## 2018-05-03 ENCOUNTER — SURGERY (OUTPATIENT)
Age: 83
End: 2018-05-03

## 2018-05-03 ENCOUNTER — ANESTHESIA (OUTPATIENT)
Dept: SURGERY | Facility: HOSPITAL | Age: 83
DRG: 375 | End: 2018-05-03
Payer: MEDICARE

## 2018-05-03 LAB
ANION GAP SERPL CALC-SCNC: 9 MMOL/L
BASOPHILS # BLD AUTO: 0.07 K/UL
BASOPHILS NFR BLD: 0.9 %
BUN SERPL-MCNC: 25 MG/DL
CALCIUM SERPL-MCNC: 10.6 MG/DL
CHLORIDE SERPL-SCNC: 111 MMOL/L
CO2 SERPL-SCNC: 18 MMOL/L
CREAT SERPL-MCNC: 1.4 MG/DL
DIFFERENTIAL METHOD: ABNORMAL
EOSINOPHIL # BLD AUTO: 0.1 K/UL
EOSINOPHIL NFR BLD: 1.3 %
ERYTHROCYTE [DISTWIDTH] IN BLOOD BY AUTOMATED COUNT: 13.4 %
EST. GFR  (AFRICAN AMERICAN): 51.1 ML/MIN/1.73 M^2
EST. GFR  (NON AFRICAN AMERICAN): 44.2 ML/MIN/1.73 M^2
GLUCOSE SERPL-MCNC: 71 MG/DL
HCT VFR BLD AUTO: 32.7 %
HGB BLD-MCNC: 10.7 G/DL
IMM GRANULOCYTES # BLD AUTO: 0.02 K/UL
IMM GRANULOCYTES NFR BLD AUTO: 0.3 %
INR PPP: 1.2
LYMPHOCYTES # BLD AUTO: 1.3 K/UL
LYMPHOCYTES NFR BLD: 16.5 %
MAGNESIUM SERPL-MCNC: 1.6 MG/DL
MCH RBC QN AUTO: 33.8 PG
MCHC RBC AUTO-ENTMCNC: 32.7 G/DL
MCV RBC AUTO: 103 FL
MONOCYTES # BLD AUTO: 0.7 K/UL
MONOCYTES NFR BLD: 8.3 %
NEUTROPHILS # BLD AUTO: 5.8 K/UL
NEUTROPHILS NFR BLD: 72.7 %
NRBC BLD-RTO: 0 /100 WBC
PHOSPHATE SERPL-MCNC: 3.2 MG/DL
PLATELET # BLD AUTO: 237 K/UL
PMV BLD AUTO: 10.5 FL
POCT GLUCOSE: 78 MG/DL (ref 70–110)
POTASSIUM SERPL-SCNC: 4.2 MMOL/L
PROTHROMBIN TIME: 11.9 SEC
RBC # BLD AUTO: 3.17 M/UL
SODIUM SERPL-SCNC: 138 MMOL/L
WBC # BLD AUTO: 7.92 K/UL

## 2018-05-03 PROCEDURE — 94761 N-INVAS EAR/PLS OXIMETRY MLT: CPT

## 2018-05-03 PROCEDURE — 37000009 HC ANESTHESIA EA ADD 15 MINS: Performed by: SURGERY

## 2018-05-03 PROCEDURE — 25000003 PHARM REV CODE 250: Performed by: NURSE ANESTHETIST, CERTIFIED REGISTERED

## 2018-05-03 PROCEDURE — D9220A PRA ANESTHESIA: Mod: ANES,,, | Performed by: ANESTHESIOLOGY

## 2018-05-03 PROCEDURE — 88333 PATH CONSLTJ SURG CYTO XM 1: CPT | Mod: 26,,, | Performed by: PATHOLOGY

## 2018-05-03 PROCEDURE — 25000003 PHARM REV CODE 250: Performed by: SURGERY

## 2018-05-03 PROCEDURE — 88341 IMHCHEM/IMCYTCHM EA ADD ANTB: CPT | Performed by: PATHOLOGY

## 2018-05-03 PROCEDURE — 37000008 HC ANESTHESIA 1ST 15 MINUTES: Performed by: SURGERY

## 2018-05-03 PROCEDURE — 36000704 HC OR TIME LEV I 1ST 15 MIN: Performed by: SURGERY

## 2018-05-03 PROCEDURE — 85025 COMPLETE CBC W/AUTO DIFF WBC: CPT

## 2018-05-03 PROCEDURE — 20600001 HC STEP DOWN PRIVATE ROOM

## 2018-05-03 PROCEDURE — 43246 EGD PLACE GASTROSTOMY TUBE: CPT | Mod: ,,, | Performed by: SURGERY

## 2018-05-03 PROCEDURE — 25000003 PHARM REV CODE 250: Performed by: NURSE PRACTITIONER

## 2018-05-03 PROCEDURE — 63600175 PHARM REV CODE 636 W HCPCS: Performed by: RADIOLOGY

## 2018-05-03 PROCEDURE — 27800903 OPTIME MED/SURG SUP & DEVICES OTHER IMPLANTS: Performed by: SURGERY

## 2018-05-03 PROCEDURE — 88342 IMHCHEM/IMCYTCHM 1ST ANTB: CPT | Mod: 26,,, | Performed by: PATHOLOGY

## 2018-05-03 PROCEDURE — 85610 PROTHROMBIN TIME: CPT

## 2018-05-03 PROCEDURE — 0DH64UZ INSERTION OF FEEDING DEVICE INTO STOMACH, PERCUTANEOUS ENDOSCOPIC APPROACH: ICD-10-PCS | Performed by: SURGERY

## 2018-05-03 PROCEDURE — 99231 SBSQ HOSP IP/OBS SF/LOW 25: CPT | Mod: GC,,, | Performed by: COLON & RECTAL SURGERY

## 2018-05-03 PROCEDURE — 80048 BASIC METABOLIC PNL TOTAL CA: CPT

## 2018-05-03 PROCEDURE — 71000044 HC DOSC ROUTINE RECOVERY FIRST HOUR: Performed by: SURGERY

## 2018-05-03 PROCEDURE — 84100 ASSAY OF PHOSPHORUS: CPT

## 2018-05-03 PROCEDURE — 83735 ASSAY OF MAGNESIUM: CPT

## 2018-05-03 PROCEDURE — 88305 TISSUE EXAM BY PATHOLOGIST: CPT | Performed by: PATHOLOGY

## 2018-05-03 PROCEDURE — 36000705 HC OR TIME LEV I EA ADD 15 MIN: Performed by: SURGERY

## 2018-05-03 PROCEDURE — 0DBW3ZX EXCISION OF PERITONEUM, PERCUTANEOUS APPROACH, DIAGNOSTIC: ICD-10-PCS | Performed by: RADIOLOGY

## 2018-05-03 PROCEDURE — D9220A PRA ANESTHESIA: Mod: CRNA,,, | Performed by: NURSE ANESTHETIST, CERTIFIED REGISTERED

## 2018-05-03 PROCEDURE — 63600175 PHARM REV CODE 636 W HCPCS: Performed by: NURSE ANESTHETIST, CERTIFIED REGISTERED

## 2018-05-03 RX ORDER — FENTANYL CITRATE 50 UG/ML
25 INJECTION, SOLUTION INTRAMUSCULAR; INTRAVENOUS EVERY 5 MIN PRN
Status: DISCONTINUED | OUTPATIENT
Start: 2018-05-03 | End: 2018-05-03 | Stop reason: HOSPADM

## 2018-05-03 RX ORDER — FENTANYL CITRATE 50 UG/ML
INJECTION, SOLUTION INTRAMUSCULAR; INTRAVENOUS
Status: DISCONTINUED | OUTPATIENT
Start: 2018-05-03 | End: 2018-05-03

## 2018-05-03 RX ORDER — SODIUM CHLORIDE 0.9 % (FLUSH) 0.9 %
3 SYRINGE (ML) INJECTION
Status: DISCONTINUED | OUTPATIENT
Start: 2018-05-03 | End: 2018-05-03 | Stop reason: HOSPADM

## 2018-05-03 RX ORDER — LIDOCAINE HYDROCHLORIDE 10 MG/ML
INJECTION, SOLUTION EPIDURAL; INFILTRATION; INTRACAUDAL; PERINEURAL
Status: DISCONTINUED | OUTPATIENT
Start: 2018-05-03 | End: 2018-05-03 | Stop reason: HOSPADM

## 2018-05-03 RX ORDER — KETAMINE HYDROCHLORIDE 100 MG/ML
INJECTION, SOLUTION INTRAMUSCULAR; INTRAVENOUS
Status: DISCONTINUED | OUTPATIENT
Start: 2018-05-03 | End: 2018-05-03

## 2018-05-03 RX ORDER — MIDAZOLAM HYDROCHLORIDE 1 MG/ML
INJECTION INTRAMUSCULAR; INTRAVENOUS CODE/TRAUMA/SEDATION MEDICATION
Status: COMPLETED | OUTPATIENT
Start: 2018-05-03 | End: 2018-05-03

## 2018-05-03 RX ORDER — MIDAZOLAM HYDROCHLORIDE 1 MG/ML
INJECTION, SOLUTION INTRAMUSCULAR; INTRAVENOUS
Status: DISCONTINUED | OUTPATIENT
Start: 2018-05-03 | End: 2018-05-03

## 2018-05-03 RX ORDER — FENTANYL CITRATE 50 UG/ML
INJECTION, SOLUTION INTRAMUSCULAR; INTRAVENOUS CODE/TRAUMA/SEDATION MEDICATION
Status: COMPLETED | OUTPATIENT
Start: 2018-05-03 | End: 2018-05-03

## 2018-05-03 RX ORDER — PROPOFOL 10 MG/ML
VIAL (ML) INTRAVENOUS
Status: DISCONTINUED | OUTPATIENT
Start: 2018-05-03 | End: 2018-05-03

## 2018-05-03 RX ORDER — LIDOCAINE HCL/PF 100 MG/5ML
SYRINGE (ML) INTRAVENOUS
Status: DISCONTINUED | OUTPATIENT
Start: 2018-05-03 | End: 2018-05-03

## 2018-05-03 RX ADMIN — PROPOFOL 20 MG: 10 INJECTION, EMULSION INTRAVENOUS at 08:05

## 2018-05-03 RX ADMIN — MIDAZOLAM HYDROCHLORIDE 1 MG: 1 INJECTION, SOLUTION INTRAMUSCULAR; INTRAVENOUS at 08:05

## 2018-05-03 RX ADMIN — METOPROLOL TARTRATE 5 MG: 5 INJECTION, SOLUTION INTRAVENOUS at 11:05

## 2018-05-03 RX ADMIN — PROPOFOL 30 MG: 10 INJECTION, EMULSION INTRAVENOUS at 08:05

## 2018-05-03 RX ADMIN — FENTANYL CITRATE 50 MCG: 50 INJECTION, SOLUTION INTRAMUSCULAR; INTRAVENOUS at 08:05

## 2018-05-03 RX ADMIN — FENTANYL CITRATE 25 MCG: 50 INJECTION, SOLUTION INTRAMUSCULAR; INTRAVENOUS at 10:05

## 2018-05-03 RX ADMIN — METOPROLOL TARTRATE 5 MG: 5 INJECTION, SOLUTION INTRAVENOUS at 02:05

## 2018-05-03 RX ADMIN — FENTANYL CITRATE 25 MCG: 50 INJECTION, SOLUTION INTRAMUSCULAR; INTRAVENOUS at 09:05

## 2018-05-03 RX ADMIN — MIDAZOLAM HYDROCHLORIDE 1 MG: 1 INJECTION, SOLUTION INTRAMUSCULAR; INTRAVENOUS at 10:05

## 2018-05-03 RX ADMIN — KETAMINE HYDROCHLORIDE 20 MG: 100 INJECTION, SOLUTION, CONCENTRATE INTRAMUSCULAR; INTRAVENOUS at 08:05

## 2018-05-03 RX ADMIN — SODIUM CHLORIDE: 0.9 INJECTION, SOLUTION INTRAVENOUS at 11:05

## 2018-05-03 RX ADMIN — MIDAZOLAM HYDROCHLORIDE 1 MG: 1 INJECTION, SOLUTION INTRAMUSCULAR; INTRAVENOUS at 09:05

## 2018-05-03 RX ADMIN — METOPROLOL TARTRATE 5 MG: 5 INJECTION, SOLUTION INTRAVENOUS at 05:05

## 2018-05-03 RX ADMIN — LIDOCAINE HYDROCHLORIDE 3 ML: 10 INJECTION, SOLUTION EPIDURAL; INFILTRATION; INTRACAUDAL; PERINEURAL at 08:05

## 2018-05-03 RX ADMIN — LIDOCAINE HYDROCHLORIDE 60 MG: 20 INJECTION, SOLUTION INTRAVENOUS at 08:05

## 2018-05-03 RX ADMIN — METOPROLOL TARTRATE 5 MG: 5 INJECTION, SOLUTION INTRAVENOUS at 06:05

## 2018-05-03 NOTE — SUBJECTIVE & OBJECTIVE
Interval History:   Patient seen and examined, no acute events overnight  Has been NPO p MN in anticipation of PEG and biopsy by IR today    Medications:  Continuous Infusions:   sodium chloride 0.9% 75 mL/hr at 05/02/18 0605     Scheduled Meds:   metoprolol  5 mg Intravenous Q6H     PRN Meds:acetaminophen, hydrALAZINE, HYDROmorphone, ondansetron, sodium chloride 0.9%     Review of patient's allergies indicates:  No Known Allergies  Objective:     Vital Signs (Most Recent):  Temp: 98.3 °F (36.8 °C) (05/03/18 0325)  Pulse: 65 (05/03/18 0510)  Resp: 18 (05/03/18 0325)  BP: (!) 148/66 (05/03/18 0510)  SpO2: 96 % (05/03/18 0325) Vital Signs (24h Range):  Temp:  [98.1 °F (36.7 °C)-98.7 °F (37.1 °C)] 98.3 °F (36.8 °C)  Pulse:  [65-92] 65  Resp:  [16-18] 18  SpO2:  [94 %-97 %] 96 %  BP: (134-187)/(66-88) 148/66     Weight: 72.6 kg (160 lb)  Body mass index is 23.63 kg/m².    Intake/Output - Last 3 Shifts       05/01 0700 - 05/02 0659 05/02 0700 - 05/03 0659    P.O. 0     I.V. (mL/kg) 1806.3 (24.9) 1775 (24.4)    Total Intake(mL/kg) 1806.3 (24.9) 1775 (24.4)    Drains 450 20    Total Output 450 20    Net +1356.3 +1755          Urine Occurrence 8 x 6 x    Stool Occurrence 1 x 1 x    Emesis Occurrence 0 x 0 x          Physical Exam   Constitutional: He is oriented to person, place, and time. He appears well-developed and well-nourished. No distress.   HENT:   Head: Normocephalic and atraumatic.   NGT in place    Cardiovascular: Normal rate and regular rhythm.    Pulmonary/Chest: Effort normal. No respiratory distress.   Abdominal:   Soft, NTND, small well healed midline incision noted   Neurological: He is alert and oriented to person, place, and time.   Psychiatric: He has a normal mood and affect. His behavior is normal.       Significant Labs:  CBC:   Recent Labs  Lab 05/02/18  0420   WBC 7.25   RBC 3.05*   HGB 10.1*   HCT 31.4*      *   MCH 33.1*   MCHC 32.2     BMP:   Recent Labs  Lab 05/03/18  0441   GLU  71      K 4.2   *   CO2 18*   BUN 25*   CREATININE 1.4   CALCIUM 10.6*   MG 1.6     CMP:   Recent Labs  Lab 04/29/18  1234  05/03/18  0441     < > 71   CALCIUM 11.4*  < > 10.6*   ALBUMIN 3.8  --   --    PROT 8.3  --   --      < > 138   K 4.9  < > 4.2   CO2 22*  < > 18*     < > 111*   BUN 25*  < > 25*   CREATININE 1.6*  < > 1.4   ALKPHOS 82  --   --    ALT 8*  --   --    AST 15  --   --    BILITOT 1.4*  --   --    < > = values in this interval not displayed.  LFTs:   Recent Labs  Lab 04/29/18  1234   ALT 8*   AST 15   ALKPHOS 82   BILITOT 1.4*   PROT 8.3   ALBUMIN 3.8     Coagulation:   Recent Labs  Lab 05/03/18  0441   LABPROT 11.9   INR 1.2     Cardiac markers:   Recent Labs  Lab 04/29/18  1234   TROPONINI <0.006

## 2018-05-03 NOTE — BRIEF OP NOTE
[facility]  Brief Operative Note    SUMMARY     Surgery Date: 5/3/2018     Surgeon(s) and Role:     * Luciano Strickland MD - Primary     * Katherine Marie MD - Resident - Assisting     * Edwin Ramirez MD - Resident - Assisting     * Venu Iniguez MD - Resident - Assisting    Pre-op Diagnosis:  SBO (small bowel obstruction) [K56.609]    Post-op Diagnosis:  Post-Op Diagnosis Codes:     * SBO (small bowel obstruction) [K56.609]    Procedure(s) (LRB):  PLACEMENT-TUBE-PEG (N/A)    Anesthesia: Choice    Description of Procedure: percutaneous gastrostomy tube placed, bumper at 3 cm, EGD with no evidence of ulcers, healthy gastric mucosa    Description of the findings of the procedure: please refer to operative note    Estimated Blood Loss: minimal          Specimens:   Specimen (12h ago through future)    None

## 2018-05-03 NOTE — PLAN OF CARE
Problem: Patient Care Overview  Goal: Plan of Care Review  Outcome: Ongoing (interventions implemented as appropriate)  POC reviewed with pt. Who verbalized understanding. AAOx 4. Remains free of falls and injuries. VSS. NPO, denies nausea. Denies pain. Assist with activity. PICC. Telemetry-AFIB. No acute events. No distress noted. WCTM.

## 2018-05-03 NOTE — PROGRESS NOTES
[facility]  General Surgery  Progress Note    Subjective:     History of Present Illness:  Sushant Cruz is an 88 y/o M PMHx of Atrial Fibrillation, Stage II Colon Cancer s/p right hemicolectomy in 07/2017, recent partial SBO which resolved with conservative management, and history of incarcerated inguinal hernia s/p repair who presented to the ED complaining of episodic lower abdominal pain. Repeat imaging concerning for moderate right hydronephrosis and small-bowel obstruction similar to prior exam with transition point within the right lower quadrant. He had a PET/CT Scan done today which is suggestive of peritoneal carcinomatosis and a single liver metastasis. There may be obstruction of the distal left ureter from peritoneal implants. He denies nausea, vomiting. Passing gas and having bowel movements. General surgery has been consulted for palliative PEG placement.    Post-Op Info:  Procedure(s) (LRB):  PLACEMENT-TUBE-PEG (N/A)         Interval History:   Patient seen and examined, no acute events overnight  Has been NPO p MN in anticipation of PEG and biopsy by IR today    Medications:  Continuous Infusions:   sodium chloride 0.9% 75 mL/hr at 05/02/18 0605     Scheduled Meds:   metoprolol  5 mg Intravenous Q6H     PRN Meds:acetaminophen, hydrALAZINE, HYDROmorphone, ondansetron, sodium chloride 0.9%     Review of patient's allergies indicates:  No Known Allergies  Objective:     Vital Signs (Most Recent):  Temp: 98.3 °F (36.8 °C) (05/03/18 0325)  Pulse: 65 (05/03/18 0510)  Resp: 18 (05/03/18 0325)  BP: (!) 148/66 (05/03/18 0510)  SpO2: 96 % (05/03/18 0325) Vital Signs (24h Range):  Temp:  [98.1 °F (36.7 °C)-98.7 °F (37.1 °C)] 98.3 °F (36.8 °C)  Pulse:  [65-92] 65  Resp:  [16-18] 18  SpO2:  [94 %-97 %] 96 %  BP: (134-187)/(66-88) 148/66     Weight: 72.6 kg (160 lb)  Body mass index is 23.63 kg/m².    Intake/Output - Last 3 Shifts       05/01 0700 - 05/02 0659 05/02 0700 - 05/03 0659     P.O. 0     I.V. (mL/kg) 1806.3 (24.9) 1775 (24.4)    Total Intake(mL/kg) 1806.3 (24.9) 1775 (24.4)    Drains 450 20    Total Output 450 20    Net +1356.3 +1755          Urine Occurrence 8 x 6 x    Stool Occurrence 1 x 1 x    Emesis Occurrence 0 x 0 x          Physical Exam   Constitutional: He is oriented to person, place, and time. He appears well-developed and well-nourished. No distress.   HENT:   Head: Normocephalic and atraumatic.   NGT in place    Cardiovascular: Normal rate and regular rhythm.    Pulmonary/Chest: Effort normal. No respiratory distress.   Abdominal:   Soft, NTND, small well healed midline incision noted   Neurological: He is alert and oriented to person, place, and time.   Psychiatric: He has a normal mood and affect. His behavior is normal.       Significant Labs:  CBC:   Recent Labs  Lab 05/02/18  0420   WBC 7.25   RBC 3.05*   HGB 10.1*   HCT 31.4*      *   MCH 33.1*   MCHC 32.2     BMP:   Recent Labs  Lab 05/03/18  0441   GLU 71      K 4.2   *   CO2 18*   BUN 25*   CREATININE 1.4   CALCIUM 10.6*   MG 1.6     CMP:   Recent Labs  Lab 04/29/18  1234  05/03/18  0441     < > 71   CALCIUM 11.4*  < > 10.6*   ALBUMIN 3.8  --   --    PROT 8.3  --   --      < > 138   K 4.9  < > 4.2   CO2 22*  < > 18*     < > 111*   BUN 25*  < > 25*   CREATININE 1.6*  < > 1.4   ALKPHOS 82  --   --    ALT 8*  --   --    AST 15  --   --    BILITOT 1.4*  --   --    < > = values in this interval not displayed.  LFTs:   Recent Labs  Lab 04/29/18  1234   ALT 8*   AST 15   ALKPHOS 82   BILITOT 1.4*   PROT 8.3   ALBUMIN 3.8     Coagulation:   Recent Labs  Lab 05/03/18  0441   LABPROT 11.9   INR 1.2     Cardiac markers:   Recent Labs  Lab 04/29/18  1234   TROPONINI <0.006     Assessment/Plan:     * SBO (small bowel obstruction)    89 yo male w Stage II Colon Cancer s/p right hemicolectomy in 07/2017, recent partial SBO which resolved with conservative management, and history of  incarcerated inguinal hernia s/p repair, no presenting with recurrent pSBO    -plan for palliative PEG placement in OR today  -has been NPO p MN  -consents obtained and in chart            Melonie Eduardo PA-C   [id number]  General Surgery  [facility]

## 2018-05-03 NOTE — SUBJECTIVE & OBJECTIVE
Subjective:     Interval History: No acute events overnight. Resting comfortably with no current complaints. NPO. NGT removed yesterday. Adequate UOP. No pain.    Post-Op Info:  Procedure(s) (LRB):  PLACEMENT-TUBE-PEG (N/A)          Medications:  Continuous Infusions:   sodium chloride 0.9% 75 mL/hr at 05/02/18 0605     Scheduled Meds:   metoprolol  5 mg Intravenous Q6H     PRN Meds:   acetaminophen    hydrALAZINE    HYDROmorphone    ondansetron    sodium chloride 0.9%        Objective:     Vital Signs (Most Recent):  Temp: 97.4 °F (36.3 °C) (05/03/18 0712)  Pulse: 76 (05/03/18 0712)  Resp: 16 (05/03/18 0712)  BP: (!) 168/80 (05/03/18 0712)  SpO2: 95 % (05/03/18 0712) Vital Signs (24h Range):  Temp:  [97.4 °F (36.3 °C)-98.7 °F (37.1 °C)] 97.4 °F (36.3 °C)  Pulse:  [65-92] 76  Resp:  [16-18] 16  SpO2:  [94 %-97 %] 95 %  BP: (134-187)/(66-88) 168/80     Intake/Output - Last 3 Shifts       05/01 0700 - 05/02 0659 05/02 0700 - 05/03 0659 05/03 0700 - 05/04 0659    P.O. 0      I.V. (mL/kg) 1806.3 (24.9) 1775 (24.4)     Total Intake(mL/kg) 1806.3 (24.9) 1775 (24.4)     Drains 450 20     Total Output 450 20      Net +1356.3 +1755             Urine Occurrence 8 x 6 x     Stool Occurrence 1 x 1 x     Emesis Occurrence 0 x 0 x           Physical Exam   Constitutional: He is oriented to person, place, and time. He appears well-developed and well-nourished.   HENT:   Head: Normocephalic and atraumatic.   Eyes: EOM are normal.   Cardiovascular: Normal rate and regular rhythm.    Pulmonary/Chest: Effort normal. No respiratory distress.   Abdominal: Soft. He exhibits distension (mild in upper abdomen). He exhibits no mass. There is no tenderness. There is no rebound and no guarding. No hernia.   Previous midline incision well healed   Musculoskeletal: Normal range of motion.   Neurological: He is alert and oriented to person, place, and time.   Skin: Skin is warm.   Psychiatric: He has a normal mood and affect. His  behavior is normal.   Nursing note and vitals reviewed.      Significant Labs:  BMP (Last 3 Results):     Recent Labs  Lab 05/01/18  0409 05/02/18  0420 05/03/18  0441   GLU 83 74 71   * 139 138   K 4.2 4.1 4.2    111* 111*   CO2 20* 18* 18*   BUN 28* 26* 25*   CREATININE 1.3 1.4 1.4   CALCIUM 10.2 10.0 10.6*   MG 1.6 1.7 1.6     CBC (Last 3 Results):     Recent Labs  Lab 05/01/18  0409 05/02/18  0420 05/03/18  0441   WBC 8.06 7.25 7.92   RBC 3.37* 3.05* 3.17*   HGB 11.4* 10.1* 10.7*   HCT 34.3* 31.4* 32.7*    246 237   * 103* 103*   MCH 33.8* 33.1* 33.8*   MCHC 33.2 32.2 32.7       Significant Diagnostics:  CT: I have reviewed all pertinent results/findings within the past 24 hours:  concerning for recurrence in retroperitoneum with hydroureter and duodenal narrowing at 2nd portion

## 2018-05-03 NOTE — TRANSFER OF CARE
"Anesthesia Transfer of Care Note    Patient: Sushant Cruz    Procedure(s) Performed: Procedure(s) (LRB):  PLACEMENT-TUBE-PEG (N/A)    Patient location: PACU    Anesthesia Type: general    Transport from OR: Transported from OR on 2-3 L/min O2 by NC with adequate spontaneous ventilation    Post pain: adequate analgesia    Post assessment: no apparent anesthetic complications    Post vital signs: stable    Level of consciousness: sedated and responds to stimulation    Nausea/Vomiting: no nausea/vomiting    Complications: none    Transfer of care protocol was followed      Last vitals:   Visit Vitals  BP (!) 168/80 (BP Location: Left arm, Patient Position: Lying)   Pulse 76   Temp 36.3 °C (97.4 °F) (Oral)   Resp 16   Ht 5' 9" (1.753 m)   Wt 72.6 kg (160 lb)   SpO2 95%   BMI 23.63 kg/m²     "

## 2018-05-03 NOTE — ASSESSMENT & PLAN NOTE
89 yo male w Stage II Colon Cancer s/p right hemicolectomy in 07/2017, recent partial SBO which resolved with conservative management, and history of incarcerated inguinal hernia s/p repair, no presenting with recurrent pSBO    -plan for palliative PEG placement in OR today  -has been NPO p MN  -consents obtained and in chart

## 2018-05-03 NOTE — PLAN OF CARE
Problem: Fall Risk (Adult)  Goal: Identify Related Risk Factors and Signs and Symptoms  Related risk factors and signs and symptoms are identified upon initiation of Human Response Clinical Practice Guideline (CPG)   No falls during this shift.     Problem: Patient Care Overview  Goal: Plan of Care Review  Pt returned from procedure with g tube. Pt tolerating well.

## 2018-05-03 NOTE — OP NOTE
DATE OF PROCEDURE:  05/03/2018    PREOPERATIVE DIAGNOSIS:  Intestinal obstruction.    POSTOPERATIVE DIAGNOSIS:  Intestinal obstruction.    PROCEDURE:  EGD with percutaneous endoscopic gastrostomy.    SURGEON:  Luciano Strickland M.D.    ASSISTANT:  Dr. Marie.    ANESTHESIA:  General.    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    PROCEDURE NOTE:  Following induction of adequate intravenous sedation, the oral   endoscope was passed down through the cricopharyngeus into the esophagus.  We   traversed a normal esophagus, stomach and duodenum and then withdrew back into   the stomach and insufflated bringing the light to the anterior abdominal wall,   which was prepped and draped with ChloraPrep.  We made a 2 cm incision over the   light and placed an Angiocath through this incision into the lumen of the   stomach and passed a wire, which was grasped with the endoscopic snare and   brought out in a retrograde fashion.  We attached a 20-Ivorian PEG tube and   utilizing the pull technique, brought it out through the anterior abdominal   wall.  We then reinserted the scope and noted the PEG to be in good position   without evidence of complication.  I withdrew the scope and placed the PEG on   gravity drainage, applied sterile dressings and attached it to the skin of the   abdominal wall with a bumper apparatus.  The patient tolerated the procedure   well.    ESTIMATED BLOOD LOSS:  2 mL.      MCT/HN  dd: 05/03/2018 09:59:09 (CDT)  td: 05/03/2018 11:40:31 (CDT)  Doc ID   #[id number]  Job ID #[id number]    CC:

## 2018-05-03 NOTE — PROGRESS NOTES
Peritoneal biopsy completed, pt tolerated well. No apparent distress noted. Dressing applied CDI. Biopsies collected and sent with pathology. Pt to recover in  till ROCU room available. Report called to primary nurse.

## 2018-05-03 NOTE — PROGRESS NOTES
Pt arrived to IR room 173 for liver/peritoneal mass biopsy, no acute distress noted. Orders, consent and labs reviewed on chart.

## 2018-05-03 NOTE — PROCEDURES
Radiology Post-Procedure Note    Pre Op Diagnosis: peritoneal mass    Post Op Diagnosis: peritoneal mass    Procedure: CT guided peritoneal biopsy    Procedure performed by: Shun Casanova MD    Written Informed Consent Obtained: Yes    Specimen Removed: YES 8 x 18 gauge    Estimated Blood Loss: Minimal    Findings:   Using CT guidance a 17g sheath needle was placed into a small peritoneal mass. 18g monopty biopsy gun used to take 8 core biopsy samples. Specimen sent to pathology.     Additional specimen sent to lab: No    Patient tolerated procedure well.    Shun Casanova MD  Department of Radiology  Pager: [phone]

## 2018-05-03 NOTE — ANESTHESIA POSTPROCEDURE EVALUATION
"Anesthesia Post Evaluation    Patient: Sushant Cruz    Procedure(s) Performed: Procedure(s) (LRB):  PLACEMENT-TUBE-PEG (N/A)    Final Anesthesia Type: general  Patient location during evaluation: PACU  Patient participation: Yes- Able to Participate  Level of consciousness: awake and alert and oriented  Post-procedure vital signs: reviewed and stable  Pain management: adequate  Airway patency: patent  PONV status at discharge: No PONV  Anesthetic complications: no      Cardiovascular status: blood pressure returned to baseline and hemodynamically stable  Respiratory status: unassisted, spontaneous ventilation and room air  Hydration status: euvolemic  Follow-up not needed.        Visit Vitals  BP (!) 179/84   Pulse 83   Temp 36.6 °C (97.9 °F) (Temporal)   Resp 20   Ht 5' 9" (1.753 m)   Wt 72.6 kg (160 lb)   SpO2 95%   BMI 23.63 kg/m²       Pain/Enrique Score: Pain Assessment Performed: Yes (5/3/2018  9:34 AM)  Presence of Pain: denies (5/3/2018  9:34 AM)  Enrique Score: 10 (5/3/2018  9:34 AM)      "

## 2018-05-03 NOTE — PROGRESS NOTES
Pt transported to ROCU, bay 5. No acute distress noted. Report given to Viktoria ZAPIEN. Pt awaiting transport.

## 2018-05-03 NOTE — PROGRESS NOTES
Daughter aware of patient procedure scheduled today, peg tube placement before liver biopsy. Verbalized understanding. Interventional radiology MD at bedside clarifying procedure schedule. Consents in chart, patient NPO since yesterday, picc intact. No belongings brought down from room.

## 2018-05-03 NOTE — PROGRESS NOTES
[facility]  Colorectal Surgery  Progress Note    Patient Name: Sushant Cruz  MRN: [id number]  Admission Date: 4/29/2018  Hospital Length of Stay: 4 days  Attending Physician: Robin Schwartz MD    Subjective:     Interval History: No acute events overnight. Resting comfortably with no current complaints. NPO. NGT removed yesterday. Adequate UOP. No pain.    Post-Op Info:  Procedure(s) (LRB):  PLACEMENT-TUBE-PEG (N/A)          Medications:  Continuous Infusions:   sodium chloride 0.9% 75 mL/hr at 05/02/18 0605     Scheduled Meds:   metoprolol  5 mg Intravenous Q6H     PRN Meds:   acetaminophen    hydrALAZINE    HYDROmorphone    ondansetron    sodium chloride 0.9%        Objective:     Vital Signs (Most Recent):  Temp: 97.4 °F (36.3 °C) (05/03/18 0712)  Pulse: 76 (05/03/18 0712)  Resp: 16 (05/03/18 0712)  BP: (!) 168/80 (05/03/18 0712)  SpO2: 95 % (05/03/18 0712) Vital Signs (24h Range):  Temp:  [97.4 °F (36.3 °C)-98.7 °F (37.1 °C)] 97.4 °F (36.3 °C)  Pulse:  [65-92] 76  Resp:  [16-18] 16  SpO2:  [94 %-97 %] 95 %  BP: (134-187)/(66-88) 168/80     Intake/Output - Last 3 Shifts       05/01 0700 - 05/02 0659 05/02 0700 - 05/03 0659 05/03 0700 - 05/04 0659    P.O. 0      I.V. (mL/kg) 1806.3 (24.9) 1775 (24.4)     Total Intake(mL/kg) 1806.3 (24.9) 1775 (24.4)     Drains 450 20     Total Output 450 20      Net +1356.3 +1755             Urine Occurrence 8 x 6 x     Stool Occurrence 1 x 1 x     Emesis Occurrence 0 x 0 x           Physical Exam   Constitutional: He is oriented to person, place, and time. He appears well-developed and well-nourished.   HENT:   Head: Normocephalic and atraumatic.   Eyes: EOM are normal.   Cardiovascular: Normal rate and regular rhythm.    Pulmonary/Chest: Effort normal. No respiratory distress.   Abdominal: Soft. He exhibits distension (mild in upper abdomen). He exhibits no mass. There is no tenderness. There is no rebound and no guarding. No hernia.   Previous midline  incision well healed   Musculoskeletal: Normal range of motion.   Neurological: He is alert and oriented to person, place, and time.   Skin: Skin is warm.   Psychiatric: He has a normal mood and affect. His behavior is normal.   Nursing note and vitals reviewed.      Significant Labs:  BMP (Last 3 Results):     Recent Labs  Lab 05/01/18  0409 05/02/18  0420 05/03/18  0441   GLU 83 74 71   * 139 138   K 4.2 4.1 4.2    111* 111*   CO2 20* 18* 18*   BUN 28* 26* 25*   CREATININE 1.3 1.4 1.4   CALCIUM 10.2 10.0 10.6*   MG 1.6 1.7 1.6     CBC (Last 3 Results):     Recent Labs  Lab 05/01/18  0409 05/02/18  0420 05/03/18  0441   WBC 8.06 7.25 7.92   RBC 3.37* 3.05* 3.17*   HGB 11.4* 10.1* 10.7*   HCT 34.3* 31.4* 32.7*    246 237   * 103* 103*   MCH 33.8* 33.1* 33.8*   MCHC 33.2 32.2 32.7       Significant Diagnostics:  CT: I have reviewed all pertinent results/findings within the past 24 hours:  concerning for recurrence in retroperitoneum with hydroureter and duodenal narrowing at 2nd portion    Assessment/Plan:     * SBO (small bowel obstruction)    Crampy abdominal pain, reflux/belching and inability to tolerate PO along with decreased bowel function likely recurrent partial SBO.     - NPO   -mIVF  -PRN pain medications   - Daily labs -currently pending  - IR biopsy of liver lesion or peritoneal met today.   - Palliative PEG placement today with ACS     Dispo: Plan to discharge home either today or tomorrow after procedures         Chronic atrial fibrillation    Hold anticoagulation for PEG placement  IV lopressor 5mg Q6              Radha Truong MD  Colorectal Surgery  [facility]

## 2018-05-04 ENCOUNTER — TELEPHONE (OUTPATIENT)
Dept: SURGERY | Facility: CLINIC | Age: 83
End: 2018-05-04

## 2018-05-04 VITALS
DIASTOLIC BLOOD PRESSURE: 78 MMHG | SYSTOLIC BLOOD PRESSURE: 150 MMHG | OXYGEN SATURATION: 96 % | WEIGHT: 160 LBS | HEIGHT: 69 IN | HEART RATE: 85 BPM | RESPIRATION RATE: 16 BRPM | TEMPERATURE: 98 F | BODY MASS INDEX: 23.7 KG/M2

## 2018-05-04 LAB
ANION GAP SERPL CALC-SCNC: 8 MMOL/L
BASOPHILS # BLD AUTO: 0.02 K/UL
BASOPHILS NFR BLD: 0.3 %
BUN SERPL-MCNC: 24 MG/DL
CALCIUM SERPL-MCNC: 10.2 MG/DL
CHLORIDE SERPL-SCNC: 113 MMOL/L
CO2 SERPL-SCNC: 19 MMOL/L
CREAT SERPL-MCNC: 1.4 MG/DL
DIFFERENTIAL METHOD: ABNORMAL
EOSINOPHIL # BLD AUTO: 0.1 K/UL
EOSINOPHIL NFR BLD: 2.2 %
ERYTHROCYTE [DISTWIDTH] IN BLOOD BY AUTOMATED COUNT: 13.3 %
EST. GFR  (AFRICAN AMERICAN): 51.1 ML/MIN/1.73 M^2
EST. GFR  (NON AFRICAN AMERICAN): 44.2 ML/MIN/1.73 M^2
GLUCOSE SERPL-MCNC: 78 MG/DL
HCT VFR BLD AUTO: 30.1 %
HGB BLD-MCNC: 9.8 G/DL
IMM GRANULOCYTES # BLD AUTO: 0.02 K/UL
IMM GRANULOCYTES NFR BLD AUTO: 0.3 %
LYMPHOCYTES # BLD AUTO: 1.1 K/UL
LYMPHOCYTES NFR BLD: 19.4 %
MAGNESIUM SERPL-MCNC: 1.5 MG/DL
MCH RBC QN AUTO: 33.8 PG
MCHC RBC AUTO-ENTMCNC: 32.6 G/DL
MCV RBC AUTO: 104 FL
MONOCYTES # BLD AUTO: 0.6 K/UL
MONOCYTES NFR BLD: 9.9 %
NEUTROPHILS # BLD AUTO: 4 K/UL
NEUTROPHILS NFR BLD: 67.9 %
NRBC BLD-RTO: 0 /100 WBC
PHOSPHATE SERPL-MCNC: 3.2 MG/DL
PLATELET # BLD AUTO: 199 K/UL
PMV BLD AUTO: 10.6 FL
POTASSIUM SERPL-SCNC: 4 MMOL/L
RBC # BLD AUTO: 2.9 M/UL
SODIUM SERPL-SCNC: 140 MMOL/L
WBC # BLD AUTO: 5.87 K/UL

## 2018-05-04 PROCEDURE — 80048 BASIC METABOLIC PNL TOTAL CA: CPT

## 2018-05-04 PROCEDURE — 84100 ASSAY OF PHOSPHORUS: CPT

## 2018-05-04 PROCEDURE — 85025 COMPLETE CBC W/AUTO DIFF WBC: CPT

## 2018-05-04 PROCEDURE — 25000003 PHARM REV CODE 250: Performed by: STUDENT IN AN ORGANIZED HEALTH CARE EDUCATION/TRAINING PROGRAM

## 2018-05-04 PROCEDURE — 83735 ASSAY OF MAGNESIUM: CPT

## 2018-05-04 PROCEDURE — 25000003 PHARM REV CODE 250: Performed by: SURGERY

## 2018-05-04 RX ORDER — LANOLIN ALCOHOL/MO/W.PET/CERES
400 CREAM (GRAM) TOPICAL 2 TIMES DAILY
Status: DISCONTINUED | OUTPATIENT
Start: 2018-05-04 | End: 2018-05-04 | Stop reason: HOSPADM

## 2018-05-04 RX ORDER — HYDROCODONE BITARTRATE AND ACETAMINOPHEN 5; 325 MG/1; MG/1
1 TABLET ORAL EVERY 6 HOURS PRN
Qty: 21 TABLET | Refills: 0 | Status: SHIPPED | OUTPATIENT
Start: 2018-05-04

## 2018-05-04 RX ADMIN — METOPROLOL TARTRATE 5 MG: 5 INJECTION, SOLUTION INTRAVENOUS at 05:05

## 2018-05-04 RX ADMIN — MAGNESIUM OXIDE TAB 400 MG (241.3 MG ELEMENTAL MG) 400 MG: 400 (241.3 MG) TAB at 09:05

## 2018-05-04 NOTE — ASSESSMENT & PLAN NOTE
Crampy abdominal pain, reflux/belching and inability to tolerate PO along with decreased bowel function likely recurrent partial SBO.     - Advance to low residue diet  -Gtube clamping trails. Will have nurses perform Gtube teaching  -Home health orders placed for discharge planning  -Will contact heme/onc to determine if they would want to keep PICC line in at discharge, otherwise will remove.  -PRN pain medications   - Daily labs -WNL     Dispo: Plan to discharge today if tolerates diet well. Will discuss with staff

## 2018-05-04 NOTE — NURSING
Discharge instructions and prescription given and explained to patient and daughter. Both verbalized understanding of instructions. Pt and daughter provided education by RN regarding peg tube. Supplies given to pt and daughter to assist with managing peg tube. Both verbalized understanding of instructions. PICC line removed. Pt tolerated well. Pt tolerated breakfast. Awaiting family member for transportation home.

## 2018-05-04 NOTE — PLAN OF CARE
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       No future appointments.    I have seen and examined this patient face to face today. My clinical findings that support the need for the home health skilled services and home bound status are the following:  Medical restrictions requiring assistance of another human to leave home due to  Newly placed G-tube/ostomy.    Allergies:Review of patient's allergies indicates:  No Known Allergies    Diet: Low residue diet    Activities: activity as tolerated. No heavy lifting (>10lbs) for 6 weeks after surgery.     Nursing:   SN to complete comprehensive assessment including routine vital signs. Instruct on disease process and s/s of complications to report to MD. Review/verify medication list sent home with the patient at time of discharge  and instruct patient/caregiver as needed. Frequency may be adjusted depending on start of care date.    Notify MD if SBP > 160 or < 90; DBP > 90 or < 50; HR > 120 or < 50; Temp > 101;    MISCELLANEOUS CARE:  PEG Care:  Instruct patient/caregiver to clean site.  Monitor skin integrity.    WOUND CARE ORDERS  yes:  Surgical Wound:  Location: abdomen PEG TUBE    Medications: Review discharge  medications with patient and family and provide education.      Current Discharge Medication List      CONTINUE these medications which have NOT CHANGED    Details   cyanocobalamin 2000 MCG tablet Take 2,000 mcg by mouth once daily.      DABIGATRAN ETEXILATE MESYLATE (PRADAXA ORAL) Take by mouth.      ferrous sulfate 325 mg (65 mg iron) Tab tablet Take 325 mg by mouth 3 (three) times daily.      metoprolol succinate (TOPROL-XL) 25 MG 24 hr tablet TK 1 T PO QD  Refills: 0             I certify that this patient is confined to his home and needs intermittent skilled nursing care.      GENIA Truong MD   General Surgery- PGY1   [phone]

## 2018-05-04 NOTE — HOSPITAL COURSE
Patient presented with partial small bowel obstruction with inability to tolerate PO. NPO and NGT was placed on admission. 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 Oncology was consulted and recommended obtaining biopsy of liver vs. peritoneal implant to determine if he has recurrent colon cancer vs. new primary. He had PEG placed with acute care surgery on 5/3 as well as peritoneal mass biopsied with IR on 5/3. He did well with both procedures. He progressed throughout his hospital course with NG removed on 5/2. On day of discharge 5/4 (POD#1) he was tolerating regular diet with no nausea or vomiting with Gtube clamped. Having bowel movements and ambulating without difficulty. He was educated on gtube home care and was set up with home health services. Discharged home in good condition with follow up in 2 weeks.

## 2018-05-04 NOTE — SUBJECTIVE & OBJECTIVE
Subjective:     Interval History: s/p PEG placement with ACS team 5/3/18; s/p IR biopsy of peritoneal mass  No acute events overnight. Resting comfortably with no current complaints. NPO. 225 cc out Gtube- currently to gravity. No nausea or vomiting. Adequate UOP. No pain.    Post-Op Info:  Procedure(s) (LRB):  PLACEMENT-TUBE-PEG (N/A)   1 Day Post-Op      Medications:  Continuous Infusions:   sodium chloride 0.9% 75 mL/hr at 05/03/18 2348     Scheduled Meds:   metoprolol  5 mg Intravenous Q6H     PRN Meds:   acetaminophen    hydrALAZINE    HYDROmorphone    ondansetron    sodium chloride 0.9%        Objective:     Vital Signs (Most Recent):  Temp: 98 °F (36.7 °C) (05/04/18 0748)  Pulse: 85 (05/04/18 0748)  Resp: 16 (05/04/18 0748)  BP: (!) 150/78 (05/04/18 0748)  SpO2: 96 % (05/04/18 0748) Vital Signs (24h Range):  Temp:  [97.1 °F (36.2 °C)-98.6 °F (37 °C)] 98 °F (36.7 °C)  Pulse:  [65-92] 85  Resp:  [16-23] 16  SpO2:  [92 %-99 %] 96 %  BP: (133-184)/(7-91) 150/78     Intake/Output - Last 3 Shifts       05/02 0700 - 05/03 0659 05/03 0700 - 05/04 0659 05/04 0700 - 05/05 0659    P.O.  0     I.V. (mL/kg) 1775 (24.4) 1816.3 (25)     Total Intake(mL/kg) 1775 (24.4) 1816.3 (25)     Drains 20 225     Total Output 20 225      Net +1755 +1591.3             Urine Occurrence 6 x 4 x     Stool Occurrence 1 x 0 x     Emesis Occurrence 0 x 0 x           Physical Exam   Constitutional: He is oriented to person, place, and time. He appears well-developed and well-nourished.   HENT:   Head: Normocephalic and atraumatic.   Eyes: EOM are normal.   Cardiovascular: Normal rate and regular rhythm.    Pulmonary/Chest: Effort normal. No respiratory distress.   Abdominal: Soft. He exhibits distension (mild in upper abdomen). He exhibits no mass. There is no tenderness. There is no rebound and no guarding. No hernia.   Previous midline incision well healed  Gtube in place to gravity.   Musculoskeletal: Normal range of motion.    Neurological: He is alert and oriented to person, place, and time.   Skin: Skin is warm.   Psychiatric: He has a normal mood and affect. His behavior is normal.   Nursing note and vitals reviewed.      Significant Labs:  BMP (Last 3 Results):     Recent Labs  Lab 05/02/18  0420 05/03/18  0441 05/04/18  0357   GLU 74 71 78    138 140   K 4.1 4.2 4.0   * 111* 113*   CO2 18* 18* 19*   BUN 26* 25* 24*   CREATININE 1.4 1.4 1.4   CALCIUM 10.0 10.6* 10.2   MG 1.7 1.6 1.5*     CBC (Last 3 Results):     Recent Labs  Lab 05/02/18  0420 05/03/18  0441 05/04/18  0357   WBC 7.25 7.92 5.87   RBC 3.05* 3.17* 2.90*   HGB 10.1* 10.7* 9.8*   HCT 31.4* 32.7* 30.1*    237 199   * 103* 104*   MCH 33.1* 33.8* 33.8*   MCHC 32.2 32.7 32.6       Significant Diagnostics:  CT: I have reviewed all pertinent results/findings within the past 24 hours:  concerning for recurrence in retroperitoneum with hydroureter and duodenal narrowing at 2nd portion

## 2018-05-04 NOTE — PROGRESS NOTES
[facility]  Colorectal Surgery  Progress Note    Patient Name: Sushant Cruz  MRN: [id number]  Admission Date: 4/29/2018  Hospital Length of Stay: 5 days  Attending Physician: Robin Schwartz MD    Subjective:     Interval History: s/p PEG placement with ACS team 5/3/18; s/p IR biopsy of peritoneal mass  No acute events overnight. Resting comfortably with no current complaints. NPO. 225 cc out Gtube- currently to gravity. No nausea or vomiting. Adequate UOP. No pain.    Post-Op Info:  Procedure(s) (LRB):  PLACEMENT-TUBE-PEG (N/A)   1 Day Post-Op      Medications:  Continuous Infusions:   sodium chloride 0.9% 75 mL/hr at 05/03/18 2348     Scheduled Meds:   metoprolol  5 mg Intravenous Q6H     PRN Meds:   acetaminophen    hydrALAZINE    HYDROmorphone    ondansetron    sodium chloride 0.9%        Objective:     Vital Signs (Most Recent):  Temp: 98 °F (36.7 °C) (05/04/18 0748)  Pulse: 85 (05/04/18 0748)  Resp: 16 (05/04/18 0748)  BP: (!) 150/78 (05/04/18 0748)  SpO2: 96 % (05/04/18 0748) Vital Signs (24h Range):  Temp:  [97.1 °F (36.2 °C)-98.6 °F (37 °C)] 98 °F (36.7 °C)  Pulse:  [65-92] 85  Resp:  [16-23] 16  SpO2:  [92 %-99 %] 96 %  BP: (133-184)/(7-91) 150/78     Intake/Output - Last 3 Shifts       05/02 0700 - 05/03 0659 05/03 0700 - 05/04 0659 05/04 0700 - 05/05 0659    P.O.  0     I.V. (mL/kg) 1775 (24.4) 1816.3 (25)     Total Intake(mL/kg) 1775 (24.4) 1816.3 (25)     Drains 20 225     Total Output 20 225      Net +1755 +1591.3             Urine Occurrence 6 x 4 x     Stool Occurrence 1 x 0 x     Emesis Occurrence 0 x 0 x           Physical Exam   Constitutional: He is oriented to person, place, and time. He appears well-developed and well-nourished.   HENT:   Head: Normocephalic and atraumatic.   Eyes: EOM are normal.   Cardiovascular: Normal rate and regular rhythm.    Pulmonary/Chest: Effort normal. No respiratory distress.   Abdominal: Soft. He exhibits distension (mild in upper  abdomen). He exhibits no mass. There is no tenderness. There is no rebound and no guarding. No hernia.   Previous midline incision well healed  Gtube in place to gravity.   Musculoskeletal: Normal range of motion.   Neurological: He is alert and oriented to person, place, and time.   Skin: Skin is warm.   Psychiatric: He has a normal mood and affect. His behavior is normal.   Nursing note and vitals reviewed.      Significant Labs:  BMP (Last 3 Results):     Recent Labs  Lab 05/02/18  0420 05/03/18  0441 05/04/18  0357   GLU 74 71 78    138 140   K 4.1 4.2 4.0   * 111* 113*   CO2 18* 18* 19*   BUN 26* 25* 24*   CREATININE 1.4 1.4 1.4   CALCIUM 10.0 10.6* 10.2   MG 1.7 1.6 1.5*     CBC (Last 3 Results):     Recent Labs  Lab 05/02/18  0420 05/03/18  0441 05/04/18  0357   WBC 7.25 7.92 5.87   RBC 3.05* 3.17* 2.90*   HGB 10.1* 10.7* 9.8*   HCT 31.4* 32.7* 30.1*    237 199   * 103* 104*   MCH 33.1* 33.8* 33.8*   MCHC 32.2 32.7 32.6       Significant Diagnostics:  CT: I have reviewed all pertinent results/findings within the past 24 hours:  concerning for recurrence in retroperitoneum with hydroureter and duodenal narrowing at 2nd portion    Assessment/Plan:     * SBO (small bowel obstruction)    Crampy abdominal pain, reflux/belching and inability to tolerate PO along with decreased bowel function likely recurrent partial SBO.     - Advance to low residue diet  -Gtube clamping trails. Will have nurses perform Gtube teaching  -Home health orders placed for discharge planning  -Will contact heme/onc to determine if they would want to keep PICC line in at discharge, otherwise will remove.  -PRN pain medications   - Daily labs -WNL     Dispo: Plan to discharge today if tolerates diet well. Will discuss with staff        Chronic atrial fibrillation    Hold anticoagulation for PEG placement  IV lopressor 5mg Q6              Radha Truong MD  Colorectal Surgery  [facility]

## 2018-05-04 NOTE — SUBJECTIVE & OBJECTIVE
Interval History:   Patient seen and examined, no acute events overnight  S/p PEG placement POD 1  PEG put out 225mL overnight    Medications:  Continuous Infusions:   sodium chloride 0.9% 75 mL/hr at 05/03/18 2348     Scheduled Meds:   metoprolol  5 mg Intravenous Q6H     PRN Meds:acetaminophen, hydrALAZINE, HYDROmorphone, ondansetron, sodium chloride 0.9%     Review of patient's allergies indicates:  No Known Allergies  Objective:     Vital Signs (Most Recent):  Temp: 97.8 °F (36.6 °C) (05/04/18 0350)  Pulse: 86 (05/04/18 0455)  Resp: 16 (05/04/18 0350)  BP: (!) 143/75 (05/04/18 0455)  SpO2: (!) 92 % (05/04/18 0350) Vital Signs (24h Range):  Temp:  [97.1 °F (36.2 °C)-98.6 °F (37 °C)] 97.8 °F (36.6 °C)  Pulse:  [65-92] 86  Resp:  [16-23] 16  SpO2:  [92 %-99 %] 92 %  BP: (133-184)/(7-91) 143/75     Weight: 72.6 kg (160 lb)  Body mass index is 23.63 kg/m².    Intake/Output - Last 3 Shifts       05/02 0700 - 05/03 0659 05/03 0700 - 05/04 0659    P.O.  0    I.V. (mL/kg) 1775 (24.4) 1816.3 (25)    Total Intake(mL/kg) 1775 (24.4) 1816.3 (25)    Drains 20 225    Total Output 20 225    Net +1755 +1591.3          Urine Occurrence 6 x 4 x    Stool Occurrence 1 x 0 x    Emesis Occurrence 0 x 0 x          Physical Exam   Constitutional: He is oriented to person, place, and time. He appears well-developed and well-nourished. No distress.   HENT:   Head: Normocephalic and atraumatic.   NGT in place    Cardiovascular: Normal rate and regular rhythm.    Pulmonary/Chest: Effort normal. No respiratory distress.   Abdominal:   Soft, NTND, PEG in place - clean, dry and intact   Neurological: He is alert and oriented to person, place, and time.   Psychiatric: He has a normal mood and affect. His behavior is normal.       Significant Labs:  CBC:   Recent Labs  Lab 05/03/18  0441   WBC 7.92   RBC 3.17*   HGB 10.7*   HCT 32.7*      *   MCH 33.8*   MCHC 32.7     BMP:   Recent Labs  Lab 05/03/18 0441   GLU 71      K  4.2   *   CO2 18*   BUN 25*   CREATININE 1.4   CALCIUM 10.6*   MG 1.6     CMP:   Recent Labs  Lab 04/29/18  1234  05/03/18  0441     < > 71   CALCIUM 11.4*  < > 10.6*   ALBUMIN 3.8  --   --    PROT 8.3  --   --      < > 138   K 4.9  < > 4.2   CO2 22*  < > 18*     < > 111*   BUN 25*  < > 25*   CREATININE 1.6*  < > 1.4   ALKPHOS 82  --   --    ALT 8*  --   --    AST 15  --   --    BILITOT 1.4*  --   --    < > = values in this interval not displayed.  LFTs:   Recent Labs  Lab 04/29/18  1234   ALT 8*   AST 15   ALKPHOS 82   BILITOT 1.4*   PROT 8.3   ALBUMIN 3.8     Coagulation:   Recent Labs  Lab 05/03/18  0441   LABPROT 11.9   INR 1.2     Cardiac markers:   Recent Labs  Lab 04/29/18  1234   TROPONINI <0.006

## 2018-05-04 NOTE — PLAN OF CARE
SW informed by C that pt d/c.  Pt in need of HH services.  SW contacted pt about HH choice.  SW spoke withpt daughter who informed SW that the family would likr [facility] as their HH provider.  SW sent referral too [facility] for HH services.  No other social needs identified.             Yaneth Arnold, LMSW [facility]  [id number]

## 2018-05-04 NOTE — TELEPHONE ENCOUNTER
----- Message from Kaitlynn Yang sent at 5/4/2018 12:50 PM CDT -----  Patient Requesting Sooner Appointment.     Reason: Hospital discharge, 2 wks f/u appt needed.  Name of Caller: pt's daughter Nusrat   When is the first available appointment? 05/31/18  Communication Preference ([facility] response to Pt. (or) Call Back # and timeframe):  [phone]  Additional Information:

## 2018-05-04 NOTE — ASSESSMENT & PLAN NOTE
89 yo male w Stage II Colon Cancer s/p right hemicolectomy in 07/2017, recent partial SBO which resolved with conservative management, and history of incarcerated inguinal hernia s/p repair, no presenting with recurrent pSBO, s/p PEG placement 5/3/18    -patient doing well after procedure  -ok to begin using PEG for tube feeds  -ok to eat by mouth per primary  -surgery will sign off at this time, please call for questions, thank you for allowing us to participate in the care of Mr. Cruz

## 2018-05-04 NOTE — DISCHARGE SUMMARY
[facility]  Colorectal Surgery  Discharge Summary      Patient Name: Sushant Cruz  MRN: [id number]  Admission Date: 4/29/2018  Hospital Length of Stay: 5 days  Discharge Date and Time:  05/04/2018 1:37 PM  Attending Physician: Efren Kelly MD  Discharging Provider: Radha Truong MD  Primary Care Provider: Bentley Lyon MD     HPI:  89 yo M with history of stage II colon cancer s/p right hemicolectomy in 2007 (Dr. Schwartz) who was recently admitted for pSBO - managed conservatively (discharged 4/12/18) and incidental right hydronephrosis who presents with decreased PO intake. He did not require an NG tube his last admission.     He reports he has had stable crampy abdominal pain since discharge. His last normal BM was Friday. He has had increased belching and inability to tolerate PO since Friday. He has not had any nausea or emesis. No worsening abdominal pain.     On arrival to ED, he is hemodynamically stable. WBC is normal.     Procedure(s) (LRB):  PLACEMENT-TUBE-PEG (N/A)     Hospital Course:  Patient presented with partial small bowel obstruction with inability to tolerate PO. NPO and NGT was placed on admission. 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 Oncology was consulted and recommended obtaining biopsy of liver vs. peritoneal implant to determine if he has recurrent colon cancer vs. new primary. He had PEG placed with acute care surgery on 5/3 as well as peritoneal mass biopsied with IR on 5/3. He did well with both procedures. He progressed throughout his hospital course with NG removed on 5/2. On day of discharge 5/4 (POD#1) he was tolerating regular diet with no nausea or vomiting with Gtube clamped. Having bowel movements and ambulating without difficulty. He was educated on gtube home care and was set up with home health services. Discharged home in good condition with follow up in 2 weeks.     Consults         Status Ordering  Provider     Inpatient consult to Hematology/Oncology  Once     Provider:  (Not yet assigned)    Completed GINNY MATHEWS     Inpatient [facility] PICC team ([facility]  Once     Provider:  (Not yet assigned)    Completed GINNY MATHEWS          Significant Diagnostic Studies: Labs:   BMP:     Recent Labs  Lab 05/03/18  0441 05/04/18  0357   GLU 71 78    140   K 4.2 4.0   * 113*   CO2 18* 19*   BUN 25* 24*   CREATININE 1.4 1.4   CALCIUM 10.6* 10.2   MG 1.6 1.5*   , CMP     Recent Labs  Lab 05/03/18  0441 05/04/18  0357    140   K 4.2 4.0   * 113*   CO2 18* 19*   GLU 71 78   BUN 25* 24*   CREATININE 1.4 1.4   CALCIUM 10.6* 10.2   ANIONGAP 9 8   ESTGFRAFRICA 51.1* 51.1*   EGFRNONAA 44.2* 44.2*   , CBC     Recent Labs  Lab 05/03/18 0441 05/04/18 0357   WBC 7.92 5.87   HGB 10.7* 9.8*   HCT 32.7* 30.1*    199    and INR   Lab Results   Component Value Date    INR 1.2 05/03/2018       Pending Diagnostic Studies:     Procedure Component Value Units Date/Time    Cytology Specimen-FNA-Radiology Assisted with Path Adequacy-Core BX [[id number]] Collected:  05/03/18 1043    Order Status:  Sent Lab Status:  In process Updated:  05/03/18 1524    Specimen:  Biopsy from Soft tissue (radiology assisted with on site path adequacy)     IR Biopsy Liver [[id number]] Resulted:  05/03/18 0929    Order Status:  Sent Lab Status:  In process Updated:  05/03/18 1058    IR Biopsy Liver [[id number]]     Order Status:  Sent Lab Status:  No result         Final Active Diagnoses:    Diagnosis Date Noted POA    PRINCIPAL PROBLEM:  SBO (small bowel obstruction) [K56.609] 04/10/2018 Yes    Small bowel obstruction [K56.609] 05/01/2018 Yes    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    Specialty:  Colon and Rectal Surgery  Contact information:  [address]  [facility] [id number]  [phone]             Lucinda Dumont MD In 1 week.    Specialties:  Hematology and Oncology, Hematology  Contact information:  [address]  [facility] [id number] [phone]                 Patient Instructions:     Diet Adult Regular   Order Comments: Low residue/fiber diet     Activity as tolerated     Lifting restrictions   Order Comments: No heavy lifting (>10lbs) for 6 weeks after surgery.     Notify your health care provider if you experience any of the following:  increased confusion or weakness     Notify your health care provider if you experience any of the following:  persistent dizziness, light-headedness, or visual disturbances     Notify your health care provider if you experience any of the following:  worsening rash     Notify your health care provider if you experience any of the following:  severe persistent headache     Notify your health care provider if you experience any of the following:  difficulty breathing or increased cough     Notify your health care provider if you experience any of the following:  redness, tenderness, or signs of infection (pain, swelling, redness, odor or green/yellow discharge around incision site)     Notify your health care provider if you experience any of the following:  severe uncontrolled pain     Notify your health care provider if you experience any of the following:  persistent nausea and vomiting or diarrhea     Notify your health care provider if you experience any of the following:  temperature >100.4     No dressing needed     Other restrictions (specify):       Medications:   Reconciled Home Medications:      Medication List       START taking these medications    hydrocodone-acetaminophen 5-325mg 5-325 mg per tablet  Commonly known as:  NORCO  Take 1 tablet by mouth every 6 (six) hours as needed for Pain.        CONTINUE taking these medications    cyanocobalamin 2000 MCG tablet  Take 2,000 mcg by mouth once daily.     ferrous sulfate 325 mg (65 mg iron) Tab tablet  Take 325 mg by mouth 3 (three) times daily.     metoprolol succinate 25 MG 24 hr tablet  Commonly known as:  TOPROL-XL  TK 1 T PO QD     PRADAXA ORAL  Take by mouth.            Radha Truong MD  Colorectal Surgery  [facility]

## 2018-05-04 NOTE — PLAN OF CARE
Problem: Patient Care Overview  Goal: Plan of Care Review  Outcome: Ongoing (interventions implemented as appropriate)  POC reviewed with pt. Who verbalized understanding. AAOx 4. Remains free of falls and injuries. VSS. NPO, denies nausea. Denies pain. Assist with activity. PICC. Telemetry-AFIB. G-Gravity. No acute events. No distress noted. WCTM.

## 2018-05-04 NOTE — DISCHARGE INSTRUCTIONS
Deciding About Artificial Feeding   When you have a serious illness, your healthcare provider will review treatment choices with you as your illness progresses. Some of these treatments help support or sustain life if your body can no longer perform certain functions on its own. Artificial feeding is one such treatment. It supplies artificial nutrition to your body if you can no longer take in food by mouth. This sheet tells you more about artificial feeding and what you need to know when deciding about this treatment.     You and your loved ones can meet with the health care provider and health care team to learn more about artificial feeding and what it means for you.   How is artificial feeding given?  Artificial feeding can be given in a number of ways. Each way involves the use of a tube to send liquid food to the body. Some types of tubes include:  · Nasogastric (NG) tube. This tube is placed through the nose and down into the stomach. It sends liquid food directly to the stomach.  · Gastrostomy tube (G-tube) or percutaneous endoscopic gastrostomy tube (PEG tube). This tube is placed through a small hole in the stomach. It sends liquid food directly into the stomach.  · IV tube. This tube is placed into a vein. It sends liquid food directly into the blood vessels.  What are the risks of artificial feeding?  Risks can include bleeding or infection at the tube site and problems with the tube. Pneumonia, a life-threatening condition, can result when artificial nutrition goes into the lungs instead of the stomach.  What happens if I choose to have artificial feeding?  You will receive artificial nutrition to help your body function. This may help you feel better and improve your quality of life. If you are near the end of your life, you may find it hard to tolerate the problems that can happen with the treatment. In this case, your healthcare provider may recommend against artificial feeding if it is too much of a  burden on your body, or if it will not prolong life or provide symptom relief.   What happens if I choose not to have artificial feeding?  You will continue to receive comfort care. This includes measures to relieve pain and other symptoms. If you can still chew or swallow, you may be offered food for pleasure. This is done by spoon feeding or careful hand feeding. If you cannot take in any food by mouth and choose not to have artificial feeding, your body will slowly shut down. Death will likely happen within a few days or weeks. You may find it reassuring to know that most patients near the end of death do not typically feel hunger or thirst. Dry mouth is a more common problem. This can be relieved by keeping the lips and mouth moist. Ice chips and small sips of water can also be given, if desired.  How do I decide if I want artificial feeding?  Your healthcare provider and other members of your healthcare team can tell you more about artificial feeding and what it means for you. If you want, you may include family and friends in these discussions. As you make your decision, here are some things to think about or ask your health care team:  · Will my illness improve? Or will it worsen? How likely is a cure?  · How will artificial feeding affect my health? Will having the treatment change the outcome of my illness?  · What are the risks and benefits of artificial feeding? And what problems can it cause? Will I be able to live with these problems?  · How will artificial feeding affect my comfort and quality of life?  · How long will I have to have the feeding tube for?   · Feeding tubes for patients with advanced dementia are no longer recommended due to pain and suffering that does not change the inevitable progression of the disease.  Consider your own values or sly. Also ask for advice from those who share your values.  Note: If youre having trouble deciding about artificial feeding, ask your healthcare  provider if you can try it for a short time to see if it helps you feel better. When the trial is done, you may then choose whether to continue or stop the treatment.   How do I state my decision about artificial feeding?  You can make your decision known by telling your healthcare provider directly. It is best to also put your treatment wishes in writing with advance directives. These are legal forms related to healthcare decisions. Laws about advance directives vary from state to state. Ask your healthcare provider about what forms are needed to make sure your wishes will be followed. Some common forms include:   · A durable power of  for healthcare or health care proxy form. This form allows you to name a person to make treatment decisions on your behalf when you cant. This person is often called a healthcare proxy, medical or healthcare power of , surrogate decision maker, or agent.  · A living will. This form tells others the kinds of treatment you want or dont want when you become too ill or injured to speak for yourself.  Keep in mind that you can change or cancel an advance directive at any time. Make it a practice to review your decisions each time there is a change in your health or goals of care. Also be sure to tell your healthcare proxy and loved ones of any changes in your decisions.  Deciding about artificial feeding for a loved one  Ideally, the decision about artificial feeding is made with the patients consent. But in some cases, the decision may fall to the patients healthcare proxy or other adult. If you need to decide about artificial feeding for a loved one, start by talking to his or her healthcare provider. Discuss the goals of care and the benefits and burdens of the treatment on your loved ones health. Also think about your loved ones wishes and values. If needed, seek advice from other healthcare team members, like a  or .   Date Last  Reviewed: 4/1/2017  © 9584-2036 [employer]. [address] [place] [address], [place] [address]. All rights reserved. This information is not intended as a substitute for professional medical care. Always follow your healthcare professional's instructions.

## 2018-05-08 ENCOUNTER — TELEPHONE (OUTPATIENT)
Dept: SURGERY | Facility: CLINIC | Age: 83
End: 2018-05-08

## 2018-05-08 NOTE — TELEPHONE ENCOUNTER
----- Message from Aurora Coreas NP sent at 5/8/2018  2:51 PM CDT -----  Contact: Nusrat Estevez (Daughter)[phone]  Speak with Radha Truong she is the one who discharged home,   ----- Message -----  From: Luanne Goldsmith RN  Sent: 5/8/2018   2:47 PM  To: Aurora Coreas NP    What do we need to do for this??  ~Luanne  ----- Message -----  From: Tiffany Shirley  Sent: 5/8/2018   9:43 AM  To: Efren YANEZ Staff    She states that pt was send home dehydrated and she's calling about hospice care. Please call

## 2018-05-08 NOTE — TELEPHONE ENCOUNTER
Spoke with pt daughter and she stated that she has been in contact with oncology and hospice facility.

## 2018-05-09 ENCOUNTER — TELEPHONE (OUTPATIENT)
Dept: HEMATOLOGY/ONCOLOGY | Facility: CLINIC | Age: 83
End: 2018-05-09

## 2018-05-09 ENCOUNTER — PATIENT MESSAGE (OUTPATIENT)
Dept: HEMATOLOGY/ONCOLOGY | Facility: CLINIC | Age: 83
End: 2018-05-09

## 2018-05-09 NOTE — TELEPHONE ENCOUNTER
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  One was a phase I trial of 32 patients with advanced solid tumors, including 3 with mCRC, all of whom showed early progression and discontinued treatment'    The other was a phase 2 trial of Pembro in dMMR mCRC, pMMR CRC and dMMR cancers other than those with colorectum"  The dose was 10 mg/kg every 14 days in patients previously treated for metastatic disease.  The immune related objective response rate and immune related PFS at 20 weeks were 40% and 78% respectively for dMMR CRC and 0% and 11% for pMMR mCRC.  The median PFS abd OS were not reached in dMMR patients but was 2.2 and 5.0 months in the pMMR patients.  There are also more somatic mutations in dMMR which is predictive of efficacy of Pembro in this patient ppopulation.    Also spoke with Dr. Humphrey in [facility] in [place] per Radha's request and he also acknowledged that they have never treated FREDERICK patient with CRC with Pembro.  Moreover his molecular tests are pending. They were sent on 5/7/18 and will take at least 2 weeks to return so at this poant we do not even know his MSI status    Also discussed with daughter that even in MSI-H patients chemo is first choice.    Also discussed with my chair, Dr. Laura rondon the above situation of administering Pembro if his is FREDERICK. And she agrees that it is not hospital [facility] to treat unless we have all the documentation that Pembro is the best choice for him.    Above discussed with daughter, Radha ans all questions were answered to her satisfaction        "

## 2018-05-10 ENCOUNTER — TELEPHONE (OUTPATIENT)
Dept: HEMATOLOGY/ONCOLOGY | Facility: CLINIC | Age: 83
End: 2018-05-10

## 2018-05-10 NOTE — TELEPHONE ENCOUNTER
Spoke with hospice agency.   Madeleine is calling to state one of the patient's daughters is leaning more towards hospice for the patient---but mj is wanting the patient to be seen in clinic asap for consult in clinic. Nurse spoke with dr ureña---she OK'd patient to be seen by dr whitley. Dr whitley and dr munoz are both in agreement with appointment rescheduling.  Patient scheduled for friday at [facility] with dr whitley at 8am.

## 2018-05-10 NOTE — TELEPHONE ENCOUNTER
Received call this afternoon from jumana Nevarez with [facility]. She had completed an informational visit with patient and family yesterday when his daughters were in contact with Dr. Dumont and RN Nusrat. The hospice referral had gone through the staff with [facility]. They are interested in treatment (immunotherapy) for patient, therefore hospice not appropriate and home health would be better at present. Eliza said that new orders are needed. Reviewed notes in chart while on the phone with Eliza. Explained to her that Dr. Dumont has not seen patient yet, the home health services had been ordered by Dr. Schwartz's team for recent hospital d/c. Advised her that Dr. Dumont/covering oncologist would not be able to write orders on a patient that has not yet been seen in clinic, and patient/daughters will need to contact Dr. Schwartz's clinic. Informed Dr. Dumont's RN Nusrat of above. No other needs indicated at present for Oncology Social Worker.

## 2018-05-10 NOTE — TELEPHONE ENCOUNTER
----- Message from Trace Wilson sent at 5/10/2018 12:18 PM CDT -----  Contact: [facility]   Will to speak with physician that is possible taking Dr. Dumont's place for today in regards to pt starting hospice     Contact::[phone]

## 2018-05-14 ENCOUNTER — PATIENT MESSAGE (OUTPATIENT)
Dept: HEMATOLOGY/ONCOLOGY | Facility: CLINIC | Age: 83
End: 2018-05-14

## 2018-05-14 ENCOUNTER — TELEPHONE (OUTPATIENT)
Dept: HEMATOLOGY/ONCOLOGY | Facility: CLINIC | Age: 83
End: 2018-05-14

## 2018-05-14 NOTE — TELEPHONE ENCOUNTER
Left VM for patient to contact clinic to discuss making an appointment to come into the clinic to discuss with dr munoz---his options.

## 2018-05-15 NOTE — TELEPHONE ENCOUNTER
Spoke to daughter Dr. Estevez. She notes that he had complete obstruction after his hospital discharge and completely declined and they had to start hospice. They do want to see his final path but at this time they will continue on Hospice. I also mentioned the emails between Mrs. Travis (younger daughter) and us and she confirmed that they are both on the same page and have decided to pursue hospice. They will call us if they need anything else.

## 2018-06-01 ENCOUNTER — PATIENT MESSAGE (OUTPATIENT)
Dept: HEMATOLOGY/ONCOLOGY | Facility: CLINIC | Age: 83
End: 2018-06-01

## 2018-06-10 ENCOUNTER — PATIENT MESSAGE (OUTPATIENT)
Dept: HEMATOLOGY/ONCOLOGY | Facility: CLINIC | Age: 83
End: 2018-06-10

## 2019-01-03 NOTE — PROGRESS NOTES
"[facility] Hospital Medicine  Progress Note    Patient Name: Ifeoma Bernal  MRN: [id number]  Patient Class: IP- Inpatient   Admission Date: 2019  Length of Stay: 2 days  Attending Physician: Mahin Harper MD  Primary Care Provider: Suzanne Mcwilliams MD    [facility] Medicine Team: [facility] HOSP MED 4 Eduardo Sorto [facility]  General Surgery  Progress Note    Subjective:     History of Present Illness:  Sushant Cruz is an 88 y/o M PMHx of Atrial Fibrillation, Stage II Colon Cancer s/p right hemicolectomy in 07/2017, recent partial SBO which resolved with conservative management, and history of incarcerated inguinal hernia s/p repair who presented to the ED complaining of episodic lower abdominal pain. Repeat imaging concerning for moderate right hydronephrosis and small-bowel obstruction similar to prior exam with transition point within the right lower quadrant. He had a PET/CT Scan done today which is suggestive of peritoneal carcinomatosis and a single liver metastasis. There may be obstruction of the distal left ureter from peritoneal implants. He denies nausea, vomiting. Passing gas and having bowel movements. General surgery has been consulted for palliative PEG placement.    Post-Op Info:  Procedure(s) (LRB):  PLACEMENT-TUBE-PEG (N/A)   1 Day Post-Op     Interval History:   Patient seen and examined, no acute events overnight  S/p PEG placement POD 1  PEG put out 225mL overnight    Medications:  Continuous Infusions:   sodium chloride 0.9% 75 mL/hr at 05/03/18 2348     Scheduled Meds:   metoprolol  5 mg Intravenous Q6H     PRN Meds:acetaminophen, hydrALAZINE, HYDROmorphone, ondansetron, sodium chloride 0.9%     Review of patient's allergies indicates:  No Known Allergies  Objective:     Vital Signs (Most Recent):  Temp: 97.8 °F (36.6 °C) (05/04/18 0350)  Pulse: 86 (05/04/18 0455)  Resp: 16 (05/04/18 0350)  BP: (!) 143/75 (05/04/18 0455)  SpO2: (!) 92 % (05/04/18 0350) Vital Signs (24h Range):  Temp:  [97.1 °F (36.2 °C)-98.6 °F (37 °C)] 97.8 °F (36.6 °C)  Pulse:  [65-92] 86  Resp:  [16-23] 16  SpO2:  [92 %-99 %] 92 %  BP: (133-184)/(7-91) 143/75     Weight: 72.6 kg (160 lb)  Body mass index is 23.63 kg/m².    Intake/Output - Last 3 Shifts       05/02 0700 - 05/03 0659 05/03 0700 - 05/04 0659     P.O.  0    I.V. (mL/kg) 1775 (24.4) 1816.3 (25)    Total Intake(mL/kg) 1775 (24.4) 1816.3 (25)    Drains 20 225    Total Output 20 225    Net +1755 +1591.3          Urine Occurrence 6 x 4 x    Stool Occurrence 1 x 0 x    Emesis Occurrence 0 x 0 x          Physical Exam   Constitutional: He is oriented to person, place, and time. He appears well-developed and well-nourished. No distress.   HENT:   Head: Normocephalic and atraumatic.   NGT in place    Cardiovascular: Normal rate and regular rhythm.    Pulmonary/Chest: Effort normal. No respiratory distress.   Abdominal:   Soft, NTND, PEG in place - clean, dry and intact   Neurological: He is alert and oriented to person, place, and time.   Psychiatric: He has a normal mood and affect. His behavior is normal.       Significant Labs:  CBC:   Recent Labs  Lab 05/03/18  0441   WBC 7.92   RBC 3.17*   HGB 10.7*   HCT 32.7*      *   MCH 33.8*   MCHC 32.7     BMP:   Recent Labs  Lab 05/03/18  0441   GLU 71      K 4.2   *   CO2 18*   BUN 25*   CREATININE 1.4   CALCIUM 10.6*   MG 1.6     CMP:   Recent Labs  Lab 04/29/18  1234  05/03/18  0441     < > 71   CALCIUM 11.4*  < > 10.6*   ALBUMIN 3.8  --   --    PROT 8.3  --   --      < > 138   K 4.9  < > 4.2   CO2 22*  < > 18*     < > 111*   BUN 25*  < > 25*   CREATININE 1.6*  < > 1.4   ALKPHOS 82  --   --    ALT 8*  --   --    AST 15  --   --    BILITOT 1.4*  --   --    < > = values in this interval not displayed.  LFTs:   Recent Labs  Lab 04/29/18  1234   ALT 8*   AST 15   ALKPHOS 82   BILITOT 1.4*   PROT 8.3   ALBUMIN 3.8     Coagulation:   Recent Labs  Lab 05/03/18  0441   LABPROT 11.9   INR 1.2     Cardiac markers:   Recent Labs  Lab 04/29/18  1234   TROPONINI <0.006     Assessment/Plan:     * SBO (small bowel obstruction)    89 yo male w Stage II Colon Cancer s/p right hemicolectomy in 07/2017, recent partial SBO which resolved with conservative management, and history of incarcerated  for cough and shortness of breath.    Cardiovascular: Negative to person, place, and time.   Skin: Skin is Eduardo Sorto,  inguinal hernia s/p repair, no presenting with recurrent pSBO, s/p PEG placement 5/3/18    -patient doing well after procedure  -ok to begin using PEG for tube feeds  -ok to eat by mouth per primary  -surgery will sign off at this time, please call for questions, thank you for allowing us to participate in the care of Mr. Cruz            Melonie JOSE Eduardo PA-C   [id number]  General Surgery  [facility]   MD  Department of Hospital Medicine   [facility]

## 2020-05-02 NOTE — PROGRESS NOTES
Patient transferred to IR for biopsy with IR nurse.   Ventricular Rate : 119  Atrial Rate : 119  P-R Interval : 168  QRS Duration : 68  Q-T Interval : 324  QTC Calculation(Bazett) : 455  P Axis : 33  R Axis : 22  T Axis : 42  Diagnosis : Sinus tachycardia  Possible Left atrial enlargement  Nonspecific T wave abnormality  Abnormal ECG  When compared with ECG of 11-JUL-2019 10:38,  Vent. rate has increased BY  52 BPM  Confirmed by Angela Byrne MD ([id number]) on 5/2/2020 10:26:17 AM

## 2021-06-24 NOTE — HPI
Sushant Cruz is a 88 y.o. male with pmhx of BPH s/p TURP who was recently admitted to [facility] for a right colectomy due to ascending colon adenocarcinoma on 7/3/17. Urology was consulted for difficult jon intraoperatively. The jon was passed over a wire using a cystoscope. The patient's jon was removed on POD1 and there was no trouble with voiding or hematuria. His wife said his bleeding began once they were discharged home today. She stated his urine would be pink and turn to bright red drops at his meatus once he finished voiding. He feels he is completely emptying his bladder and he only experiences a slight burn in his distal urethra when he voids. Not having frequency or urgency.   He is not currently on anticoagulation. However he was to restart his Xarelto 5 days after surgery due to his Afib. No fevers, chills, chest pain, sob, nausea, vomiting.    
30-Mar-2021

## 2021-07-17 NOTE — ED NOTES
Pt identifiers Sushant Cruz were checked and are correct  LOC: The patient is awake, alert, aware of environment with an appropriate affect. Oriented x3, speaking appropriately  APPEARANCE: Pt resting comfortably, in no acute distress, pt is clean and well groomed, clothing properly fastened  SKIN: Skin warm, dry and intact, poor skin turgor, moist mucus membranes  RESPIRATORY: Airway is open and patent, respirations are spontaneous, even and unlabored, normal effort and rate  Breath sounds clear naty to all lung fields on auscultation  CARDIAC Radial pulses strong and irregular ,no peripheral edema noted, capillary refill < 3 seconds, bilateral radial pulses 2+  ABDOMEN: Soft, nontender, nondistended. Bowel sounds present to all four quad of abd on auscultation  NEUROLOGIC: PERRL, facial expression is symmetrical, patient moving all extremities spontaneously, normal sensation in all extremities when touched with a finger.  Follows all commands appropriately  MUSCULOSKELETAL: No obvious deformities.       no concerns

## 2021-10-26 NOTE — ED TRIAGE NOTES
Pt fcomplains of stomach cramps that have been intermittent for past 3 days  Pt also has to burp to relieve a discomfort in his chest over the past 3 days   
No

## 2022-01-08 NOTE — TELEPHONE ENCOUNTER
"Spoke with daughter, mj, who provided nurse with her extensive credentials and who she knows in the "cancer world."  She is calling to speak with dr munoz about patient's plan of care, as she has spoken to contacts at [facility] and [facility], etc.     She notes this conversation is urgent, and she needs to be spoken with today. Nurse explained to daughter dr munoz is in clinic this morning and has consults this afternoon.    She is requesting pathology report to be discussed with her---and she states she doesn't care whether the molecular studies are back at this point, as she wants patient treated with checkpoint inhibitor therapy regardless.    Nurse informed daughter she would forward message to dr munoz.     Mj's phone number [phone]    Message routed to dr munoz   "
----- Message from Ashley Rico sent at 5/9/2018  2:44 PM CDT -----  Contact: Pt   Pt called to speak with nurse and would like to get a callback today   Callback#[phone]  Thank You  BEAU Rico  
----- Message from Trace Wilson sent at 5/9/2018  8:23 AM CDT -----  Contact: DaughterEcho   Will like a call from office in regards to pathology report and next steps of care     Contact::[phone]  
Can you call the daughter please.  
Spoke with mj.  She is requesting path report to be emailed to her at [email or web address].  Nurse informed daughter email is not secure---faxing would be best.  Daughter states she wants the report emailed regardless.        Path report emailed.  
08-Jan-2022 07:57

## 2022-12-02 NOTE — ASSESSMENT & PLAN NOTE
Crampy abdominal pain, reflux/belching and inability to tolerate PO along with decreased bowel function likely recurrent partial SBO.     - NPO   -mIVF  -PRN pain medications   - Daily labs -currently pending  - IR biopsy of liver lesion or peritoneal met today.   - Palliative PEG placement today with ACS     Dispo: Plan to discharge home either today or tomorrow after procedures    Neck , no lymphadenopathy

## 2023-04-05 NOTE — ED NOTES
Patient resting safe in room with wife at bedside. Patient drinking contrast provided by radiology, tolerating well. Pt. Updated on plan of care. NAD noted at this time. Pt. Denies any current needs.   How Severe Is Your Scar?: mild Is This A New Presentation, Or A Follow-Up?: Scars Additional History: Pt wants to know if he can use something to prevent scarring

## (undated) DEVICE — SEE MEDLINE ITEM 152487

## (undated) DEVICE — ELECTRODE REM PLYHSV RETURN 9

## (undated) DEVICE — DRAPE ABDOMINAL TIBURON 14X11

## (undated) DEVICE — LUBRICANT SURGILUBE 2 OZ

## (undated) DEVICE — SEE MEDLINE ITEM 146313

## (undated) DEVICE — SPONGE LAP 18X18 PREWASHED

## (undated) DEVICE — GAUZE SPONGE 4X4 12PLY

## (undated) DEVICE — GUIDEWIRE PTFE .038INX145CM

## (undated) DEVICE — NDL HYPO A BEVEL 22X1 1/2

## (undated) DEVICE — SYR ONLY LUER LOCK 20CC

## (undated) DEVICE — Device

## (undated) DEVICE — ADHESIVE DERMABOND ADVANCED

## (undated) DEVICE — SEE L#95700

## (undated) DEVICE — DRAPE STERI 7IN

## (undated) DEVICE — WARMER DRAPE STERILE LF

## (undated) DEVICE — CLIPPER BLADE MOD 4406 (CAREF)

## (undated) DEVICE — SEE MEDLINE ITEM 152622

## (undated) DEVICE — KIT PEG PULL STANDARD 20F

## (undated) DEVICE — SEE MEDLINE ITEM 157117

## (undated) DEVICE — RELOAD PROXIMATE CUT BLUE 75MM

## (undated) DEVICE — CUTTER PROXIMATE BLUE 75MM

## (undated) DEVICE — SEE MEDLINE ITEM 146420